# Patient Record
Sex: MALE | Race: WHITE | HISPANIC OR LATINO | Employment: FULL TIME | URBAN - METROPOLITAN AREA
[De-identification: names, ages, dates, MRNs, and addresses within clinical notes are randomized per-mention and may not be internally consistent; named-entity substitution may affect disease eponyms.]

---

## 2022-08-17 ENCOUNTER — HOSPITAL ENCOUNTER (EMERGENCY)
Facility: HOSPITAL | Age: 42
Discharge: ANOTHER HOSPITAL | End: 2022-08-17
Attending: EMERGENCY MEDICINE

## 2022-08-17 ENCOUNTER — HOSPITAL ENCOUNTER (OUTPATIENT)
Facility: HOSPITAL | Age: 42
Discharge: HOME | End: 2022-08-18
Attending: EMERGENCY MEDICINE | Admitting: INTERNAL MEDICINE

## 2022-08-17 VITALS
DIASTOLIC BLOOD PRESSURE: 76 MMHG | SYSTOLIC BLOOD PRESSURE: 120 MMHG | RESPIRATION RATE: 20 BRPM | OXYGEN SATURATION: 99 % | HEART RATE: 88 BPM | TEMPERATURE: 98 F

## 2022-08-17 DIAGNOSIS — S05.92XA LEFT EYE INJURY, INITIAL ENCOUNTER: Primary | ICD-10-CM

## 2022-08-17 DIAGNOSIS — S05.32XA RUPTURED GLOBE OF LEFT EYE, INITIAL ENCOUNTER: Primary | ICD-10-CM

## 2022-08-17 DIAGNOSIS — S05.32XA RUPTURED GLOBE OF LEFT EYE, INITIAL ENCOUNTER: ICD-10-CM

## 2022-08-17 DIAGNOSIS — S05.92XA: ICD-10-CM

## 2022-08-17 DIAGNOSIS — R07.9 CHEST PAIN: ICD-10-CM

## 2022-08-17 LAB
ANION GAP SERPL CALC-SCNC: 10 MMOL/L (ref 8–16)
BASOPHILS # BLD AUTO: 0.05 K/UL (ref 0–0.2)
BASOPHILS NFR BLD: 0.6 % (ref 0–1.9)
BUN SERPL-MCNC: 16 MG/DL (ref 6–20)
CALCIUM SERPL-MCNC: 9 MG/DL (ref 8.7–10.5)
CHLORIDE SERPL-SCNC: 105 MMOL/L (ref 95–110)
CO2 SERPL-SCNC: 26 MMOL/L (ref 23–29)
CREAT SERPL-MCNC: 0.9 MG/DL (ref 0.5–1.4)
DIFFERENTIAL METHOD: ABNORMAL
EOSINOPHIL # BLD AUTO: 0.1 K/UL (ref 0–0.5)
EOSINOPHIL NFR BLD: 1.6 % (ref 0–8)
ERYTHROCYTE [DISTWIDTH] IN BLOOD BY AUTOMATED COUNT: 12.1 % (ref 11.5–14.5)
EST. GFR  (NO RACE VARIABLE): >60 ML/MIN/1.73 M^2
GLUCOSE SERPL-MCNC: 119 MG/DL (ref 70–110)
HCT VFR BLD AUTO: 39.2 % (ref 40–54)
HGB BLD-MCNC: 13.7 G/DL (ref 14–18)
IMM GRANULOCYTES # BLD AUTO: 0.05 K/UL (ref 0–0.04)
IMM GRANULOCYTES NFR BLD AUTO: 0.6 % (ref 0–0.5)
LYMPHOCYTES # BLD AUTO: 1.7 K/UL (ref 1–4.8)
LYMPHOCYTES NFR BLD: 20.2 % (ref 18–48)
MCH RBC QN AUTO: 29.5 PG (ref 27–31)
MCHC RBC AUTO-ENTMCNC: 34.9 G/DL (ref 32–36)
MCV RBC AUTO: 84 FL (ref 82–98)
MONOCYTES # BLD AUTO: 0.7 K/UL (ref 0.3–1)
MONOCYTES NFR BLD: 7.6 % (ref 4–15)
NEUTROPHILS # BLD AUTO: 6 K/UL (ref 1.8–7.7)
NEUTROPHILS NFR BLD: 69.4 % (ref 38–73)
NRBC BLD-RTO: 0 /100 WBC
PLATELET # BLD AUTO: 262 K/UL (ref 150–450)
PMV BLD AUTO: 9 FL (ref 9.2–12.9)
POTASSIUM SERPL-SCNC: 3.6 MMOL/L (ref 3.5–5.1)
RBC # BLD AUTO: 4.65 M/UL (ref 4.6–6.2)
SODIUM SERPL-SCNC: 141 MMOL/L (ref 136–145)
WBC # BLD AUTO: 8.58 K/UL (ref 3.9–12.7)

## 2022-08-17 PROCEDURE — 99285 PR EMERGENCY DEPT VISIT,LEVEL V: ICD-10-PCS | Mod: CS,,, | Performed by: PHYSICIAN ASSISTANT

## 2022-08-17 PROCEDURE — 85025 COMPLETE CBC W/AUTO DIFF WBC: CPT | Performed by: EMERGENCY MEDICINE

## 2022-08-17 PROCEDURE — 96375 TX/PRO/DX INJ NEW DRUG ADDON: CPT

## 2022-08-17 PROCEDURE — 80048 BASIC METABOLIC PNL TOTAL CA: CPT | Performed by: EMERGENCY MEDICINE

## 2022-08-17 PROCEDURE — 90715 TDAP VACCINE 7 YRS/> IM: CPT | Performed by: EMERGENCY MEDICINE

## 2022-08-17 PROCEDURE — 63600175 PHARM REV CODE 636 W HCPCS: Performed by: EMERGENCY MEDICINE

## 2022-08-17 PROCEDURE — 99291 CRITICAL CARE FIRST HOUR: CPT | Mod: 25

## 2022-08-17 PROCEDURE — 63600175 PHARM REV CODE 636 W HCPCS: Performed by: PHYSICIAN ASSISTANT

## 2022-08-17 PROCEDURE — 99285 EMERGENCY DEPT VISIT HI MDM: CPT | Mod: CS,,, | Performed by: PHYSICIAN ASSISTANT

## 2022-08-17 PROCEDURE — 90471 IMMUNIZATION ADMIN: CPT | Performed by: EMERGENCY MEDICINE

## 2022-08-17 PROCEDURE — 99285 EMERGENCY DEPT VISIT HI MDM: CPT | Mod: 25

## 2022-08-17 RX ORDER — MORPHINE SULFATE 4 MG/ML
4 INJECTION, SOLUTION INTRAMUSCULAR; INTRAVENOUS
Status: COMPLETED | OUTPATIENT
Start: 2022-08-17 | End: 2022-08-17

## 2022-08-17 RX ORDER — LEVOFLOXACIN 5 MG/ML
750 INJECTION, SOLUTION INTRAVENOUS ONCE
Status: DISCONTINUED | OUTPATIENT
Start: 2022-08-17 | End: 2022-08-17 | Stop reason: HOSPADM

## 2022-08-17 RX ADMIN — MORPHINE SULFATE 4 MG: 4 INJECTION INTRAVENOUS at 11:08

## 2022-08-17 RX ADMIN — TETANUS TOXOID, REDUCED DIPHTHERIA TOXOID AND ACELLULAR PERTUSSIS VACCINE, ADSORBED 0.5 ML: 5; 2.5; 8; 8; 2.5 SUSPENSION INTRAMUSCULAR at 10:08

## 2022-08-18 ENCOUNTER — ANESTHESIA (OUTPATIENT)
Dept: SURGERY | Facility: HOSPITAL | Age: 42
End: 2022-08-18

## 2022-08-18 ENCOUNTER — ANESTHESIA EVENT (OUTPATIENT)
Dept: SURGERY | Facility: HOSPITAL | Age: 42
End: 2022-08-18

## 2022-08-18 ENCOUNTER — TELEPHONE (OUTPATIENT)
Dept: OPHTHALMOLOGY | Facility: CLINIC | Age: 42
End: 2022-08-18

## 2022-08-18 VITALS
OXYGEN SATURATION: 99 % | TEMPERATURE: 98 F | RESPIRATION RATE: 18 BRPM | SYSTOLIC BLOOD PRESSURE: 117 MMHG | DIASTOLIC BLOOD PRESSURE: 65 MMHG | HEART RATE: 78 BPM

## 2022-08-18 PROBLEM — S05.32XA RUPTURED GLOBE OF LEFT EYE: Status: ACTIVE | Noted: 2022-08-18

## 2022-08-18 LAB
ALBUMIN SERPL BCP-MCNC: 3.9 G/DL (ref 3.5–5.2)
ALP SERPL-CCNC: 63 U/L (ref 55–135)
ALT SERPL W/O P-5'-P-CCNC: 13 U/L (ref 10–44)
ANION GAP SERPL CALC-SCNC: 10 MMOL/L (ref 8–16)
AST SERPL-CCNC: 22 U/L (ref 10–40)
BASOPHILS # BLD AUTO: 0.03 K/UL (ref 0–0.2)
BASOPHILS NFR BLD: 0.4 % (ref 0–1.9)
BILIRUB SERPL-MCNC: 0.5 MG/DL (ref 0.1–1)
BUN SERPL-MCNC: 14 MG/DL (ref 6–20)
CALCIUM SERPL-MCNC: 9.2 MG/DL (ref 8.7–10.5)
CHLORIDE SERPL-SCNC: 106 MMOL/L (ref 95–110)
CO2 SERPL-SCNC: 22 MMOL/L (ref 23–29)
CREAT SERPL-MCNC: 0.8 MG/DL (ref 0.5–1.4)
DIFFERENTIAL METHOD: ABNORMAL
EOSINOPHIL # BLD AUTO: 0.1 K/UL (ref 0–0.5)
EOSINOPHIL NFR BLD: 1 % (ref 0–8)
ERYTHROCYTE [DISTWIDTH] IN BLOOD BY AUTOMATED COUNT: 12.3 % (ref 11.5–14.5)
EST. GFR  (NO RACE VARIABLE): >60 ML/MIN/1.73 M^2
GLUCOSE SERPL-MCNC: 120 MG/DL (ref 70–110)
HCT VFR BLD AUTO: 36.8 % (ref 40–54)
HGB BLD-MCNC: 13.6 G/DL (ref 14–18)
IMM GRANULOCYTES # BLD AUTO: 0.02 K/UL (ref 0–0.04)
IMM GRANULOCYTES NFR BLD AUTO: 0.3 % (ref 0–0.5)
LYMPHOCYTES # BLD AUTO: 2.1 K/UL (ref 1–4.8)
LYMPHOCYTES NFR BLD: 27.3 % (ref 18–48)
MAGNESIUM SERPL-MCNC: 1.9 MG/DL (ref 1.6–2.6)
MCH RBC QN AUTO: 31.1 PG (ref 27–31)
MCHC RBC AUTO-ENTMCNC: 37 G/DL (ref 32–36)
MCV RBC AUTO: 84 FL (ref 82–98)
MONOCYTES # BLD AUTO: 0.5 K/UL (ref 0.3–1)
MONOCYTES NFR BLD: 6.2 % (ref 4–15)
NEUTROPHILS # BLD AUTO: 5.1 K/UL (ref 1.8–7.7)
NEUTROPHILS NFR BLD: 64.8 % (ref 38–73)
NRBC BLD-RTO: 0 /100 WBC
PHOSPHATE SERPL-MCNC: 3.1 MG/DL (ref 2.7–4.5)
PLATELET # BLD AUTO: 255 K/UL (ref 150–450)
PMV BLD AUTO: 9.3 FL (ref 9.2–12.9)
POTASSIUM SERPL-SCNC: 3.7 MMOL/L (ref 3.5–5.1)
PROT SERPL-MCNC: 7.3 G/DL (ref 6–8.4)
RBC # BLD AUTO: 4.37 M/UL (ref 4.6–6.2)
SARS-COV-2 RDRP RESP QL NAA+PROBE: NEGATIVE
SODIUM SERPL-SCNC: 138 MMOL/L (ref 136–145)
WBC # BLD AUTO: 7.85 K/UL (ref 3.9–12.7)

## 2022-08-18 PROCEDURE — 67005 PR PART REMV VITREOUS,ANT APPRCH: ICD-10-PCS | Mod: LT,,, | Performed by: OPHTHALMOLOGY

## 2022-08-18 PROCEDURE — 27200651 HC AIRWAY, LMA: Performed by: ANESTHESIOLOGY

## 2022-08-18 PROCEDURE — 25000003 PHARM REV CODE 250: Performed by: PHYSICIAN ASSISTANT

## 2022-08-18 PROCEDURE — 25000003 PHARM REV CODE 250: Performed by: OPHTHALMOLOGY

## 2022-08-18 PROCEDURE — 63600175 PHARM REV CODE 636 W HCPCS: Performed by: OPHTHALMOLOGY

## 2022-08-18 PROCEDURE — 37000008 HC ANESTHESIA 1ST 15 MINUTES: Performed by: OPHTHALMOLOGY

## 2022-08-18 PROCEDURE — 63600175 PHARM REV CODE 636 W HCPCS: Performed by: PHYSICIAN ASSISTANT

## 2022-08-18 PROCEDURE — 71000044 HC DOSC ROUTINE RECOVERY FIRST HOUR: Performed by: OPHTHALMOLOGY

## 2022-08-18 PROCEDURE — 71000015 HC POSTOP RECOV 1ST HR: Performed by: OPHTHALMOLOGY

## 2022-08-18 PROCEDURE — 25000003 PHARM REV CODE 250

## 2022-08-18 PROCEDURE — 80053 COMPREHEN METABOLIC PANEL: CPT | Performed by: PHYSICIAN ASSISTANT

## 2022-08-18 PROCEDURE — 99217 PR OBSERVATION CARE DISCHARGE: ICD-10-PCS | Mod: ,,,

## 2022-08-18 PROCEDURE — G0378 HOSPITAL OBSERVATION PER HR: HCPCS

## 2022-08-18 PROCEDURE — D9220A PRA ANESTHESIA: Mod: ,,, | Performed by: ANESTHESIOLOGY

## 2022-08-18 PROCEDURE — 63600175 PHARM REV CODE 636 W HCPCS: Performed by: NURSE ANESTHETIST, CERTIFIED REGISTERED

## 2022-08-18 PROCEDURE — 99220 PR INITIAL OBSERVATION CARE,LEVL III: ICD-10-PCS | Mod: ,,, | Performed by: PHYSICIAN ASSISTANT

## 2022-08-18 PROCEDURE — 65275 REPAIR OF EYE WOUND: CPT | Mod: 51,LT,, | Performed by: OPHTHALMOLOGY

## 2022-08-18 PROCEDURE — 36000706: Performed by: OPHTHALMOLOGY

## 2022-08-18 PROCEDURE — 65275 PR REPAIR CORNEA LAC,NONPERFORATING: ICD-10-PCS | Mod: 51,LT,, | Performed by: OPHTHALMOLOGY

## 2022-08-18 PROCEDURE — 99217 PR OBSERVATION CARE DISCHARGE: CPT | Mod: ,,,

## 2022-08-18 PROCEDURE — 83735 ASSAY OF MAGNESIUM: CPT | Performed by: PHYSICIAN ASSISTANT

## 2022-08-18 PROCEDURE — 37000009 HC ANESTHESIA EA ADD 15 MINS: Performed by: OPHTHALMOLOGY

## 2022-08-18 PROCEDURE — 25000003 PHARM REV CODE 250: Performed by: NURSE ANESTHETIST, CERTIFIED REGISTERED

## 2022-08-18 PROCEDURE — 99220 PR INITIAL OBSERVATION CARE,LEVL III: CPT | Mod: ,,, | Performed by: PHYSICIAN ASSISTANT

## 2022-08-18 PROCEDURE — 63600175 PHARM REV CODE 636 W HCPCS

## 2022-08-18 PROCEDURE — 36000707: Performed by: OPHTHALMOLOGY

## 2022-08-18 PROCEDURE — 85025 COMPLETE CBC W/AUTO DIFF WBC: CPT | Performed by: PHYSICIAN ASSISTANT

## 2022-08-18 PROCEDURE — 96365 THER/PROPH/DIAG IV INF INIT: CPT

## 2022-08-18 PROCEDURE — 63600175 PHARM REV CODE 636 W HCPCS: Performed by: ANESTHESIOLOGY

## 2022-08-18 PROCEDURE — D9220A PRA ANESTHESIA: ICD-10-PCS | Mod: ,,, | Performed by: ANESTHESIOLOGY

## 2022-08-18 PROCEDURE — U0002 COVID-19 LAB TEST NON-CDC: HCPCS | Performed by: PHYSICIAN ASSISTANT

## 2022-08-18 PROCEDURE — 67005 PARTIAL REMOVAL OF EYE FLUID: CPT | Mod: LT,,, | Performed by: OPHTHALMOLOGY

## 2022-08-18 PROCEDURE — 84100 ASSAY OF PHOSPHORUS: CPT | Performed by: PHYSICIAN ASSISTANT

## 2022-08-18 PROCEDURE — 71000016 HC POSTOP RECOV ADDL HR: Performed by: OPHTHALMOLOGY

## 2022-08-18 RX ORDER — HYDROCODONE BITARTRATE AND ACETAMINOPHEN 5; 325 MG/1; MG/1
1 TABLET ORAL EVERY 6 HOURS PRN
Qty: 28 TABLET | Refills: 0 | Status: SHIPPED | OUTPATIENT
Start: 2022-08-18 | End: 2022-08-25

## 2022-08-18 RX ORDER — MIDAZOLAM HYDROCHLORIDE 1 MG/ML
INJECTION, SOLUTION INTRAMUSCULAR; INTRAVENOUS
Status: DISCONTINUED | OUTPATIENT
Start: 2022-08-18 | End: 2022-08-18

## 2022-08-18 RX ORDER — SIMETHICONE 80 MG
1 TABLET,CHEWABLE ORAL 4 TIMES DAILY PRN
Status: DISCONTINUED | OUTPATIENT
Start: 2022-08-18 | End: 2022-08-18 | Stop reason: HOSPADM

## 2022-08-18 RX ORDER — CEFAZOLIN SODIUM 500 MG/2.2ML
INJECTION, POWDER, FOR SOLUTION INTRAMUSCULAR; INTRAVENOUS
Status: DISCONTINUED | OUTPATIENT
Start: 2022-08-18 | End: 2022-08-18 | Stop reason: HOSPADM

## 2022-08-18 RX ORDER — IBUPROFEN 200 MG
24 TABLET ORAL
Status: DISCONTINUED | OUTPATIENT
Start: 2022-08-18 | End: 2022-08-18 | Stop reason: HOSPADM

## 2022-08-18 RX ORDER — ONDANSETRON 2 MG/ML
INJECTION INTRAMUSCULAR; INTRAVENOUS
Status: DISCONTINUED | OUTPATIENT
Start: 2022-08-18 | End: 2022-08-18

## 2022-08-18 RX ORDER — PREDNISOLONE ACETATE 10 MG/ML
SUSPENSION/ DROPS OPHTHALMIC
Status: DISCONTINUED
Start: 2022-08-18 | End: 2022-08-18 | Stop reason: HOSPADM

## 2022-08-18 RX ORDER — LIDOCAINE HYDROCHLORIDE 10 MG/ML
INJECTION, SOLUTION INTRAVENOUS
Status: DISCONTINUED | OUTPATIENT
Start: 2022-08-18 | End: 2022-08-18

## 2022-08-18 RX ORDER — ACETAMINOPHEN 325 MG/1
650 TABLET ORAL EVERY 4 HOURS PRN
Status: DISCONTINUED | OUTPATIENT
Start: 2022-08-18 | End: 2022-08-18 | Stop reason: HOSPADM

## 2022-08-18 RX ORDER — SODIUM CHLORIDE 0.9 % (FLUSH) 0.9 %
10 SYRINGE (ML) INJECTION
Status: DISCONTINUED | OUTPATIENT
Start: 2022-08-18 | End: 2022-08-18 | Stop reason: HOSPADM

## 2022-08-18 RX ORDER — DEXAMETHASONE SODIUM PHOSPHATE 4 MG/ML
INJECTION, SOLUTION INTRA-ARTICULAR; INTRALESIONAL; INTRAMUSCULAR; INTRAVENOUS; SOFT TISSUE
Status: DISCONTINUED
Start: 2022-08-18 | End: 2022-08-18 | Stop reason: HOSPADM

## 2022-08-18 RX ORDER — NALOXONE HCL 0.4 MG/ML
0.02 VIAL (ML) INJECTION
Status: DISCONTINUED | OUTPATIENT
Start: 2022-08-18 | End: 2022-08-18 | Stop reason: HOSPADM

## 2022-08-18 RX ORDER — MOXIFLOXACIN HYDROCHLORIDE 400 MG/1
400 TABLET ORAL DAILY
Qty: 7 TABLET | Refills: 0 | Status: SHIPPED | OUTPATIENT
Start: 2022-08-18 | End: 2022-08-25

## 2022-08-18 RX ORDER — PREDNISOLONE ACETATE 10 MG/ML
SUSPENSION/ DROPS OPHTHALMIC
Status: DISCONTINUED | OUTPATIENT
Start: 2022-08-18 | End: 2022-08-18 | Stop reason: HOSPADM

## 2022-08-18 RX ORDER — CEFAZOLIN SODIUM 500 MG/2.2ML
INJECTION, POWDER, FOR SOLUTION INTRAMUSCULAR; INTRAVENOUS
Status: DISCONTINUED
Start: 2022-08-18 | End: 2022-08-18 | Stop reason: HOSPADM

## 2022-08-18 RX ORDER — HYDROMORPHONE HYDROCHLORIDE 1 MG/ML
0.2 INJECTION, SOLUTION INTRAMUSCULAR; INTRAVENOUS; SUBCUTANEOUS EVERY 5 MIN PRN
Status: COMPLETED | OUTPATIENT
Start: 2022-08-18 | End: 2022-08-18

## 2022-08-18 RX ORDER — ACETAMINOPHEN 500 MG
1000 TABLET ORAL 3 TIMES DAILY
Status: DISCONTINUED | OUTPATIENT
Start: 2022-08-18 | End: 2022-08-18 | Stop reason: HOSPADM

## 2022-08-18 RX ORDER — GLUCAGON 1 MG
1 KIT INJECTION
Status: DISCONTINUED | OUTPATIENT
Start: 2022-08-18 | End: 2022-08-18 | Stop reason: HOSPADM

## 2022-08-18 RX ORDER — MOXIFLOXACIN 5 MG/ML
SOLUTION/ DROPS OPHTHALMIC
Status: DISCONTINUED | OUTPATIENT
Start: 2022-08-18 | End: 2022-08-18 | Stop reason: HOSPADM

## 2022-08-18 RX ORDER — FENTANYL CITRATE 50 UG/ML
25 INJECTION, SOLUTION INTRAMUSCULAR; INTRAVENOUS EVERY 5 MIN PRN
Status: COMPLETED | OUTPATIENT
Start: 2022-08-18 | End: 2022-08-18

## 2022-08-18 RX ORDER — IBUPROFEN 200 MG
16 TABLET ORAL
Status: DISCONTINUED | OUTPATIENT
Start: 2022-08-18 | End: 2022-08-18 | Stop reason: HOSPADM

## 2022-08-18 RX ORDER — ONDANSETRON 4 MG/1
8 TABLET, ORALLY DISINTEGRATING ORAL EVERY 8 HOURS PRN
Status: DISCONTINUED | OUTPATIENT
Start: 2022-08-18 | End: 2022-08-18 | Stop reason: HOSPADM

## 2022-08-18 RX ORDER — LEVOFLOXACIN 750 MG/1
750 TABLET ORAL DAILY
Status: DISCONTINUED | OUTPATIENT
Start: 2022-08-18 | End: 2022-08-18 | Stop reason: HOSPADM

## 2022-08-18 RX ORDER — ACETAMINOPHEN 325 MG/1
650 TABLET ORAL EVERY 8 HOURS PRN
Status: DISCONTINUED | OUTPATIENT
Start: 2022-08-18 | End: 2022-08-18 | Stop reason: HOSPADM

## 2022-08-18 RX ORDER — HYDROMORPHONE HYDROCHLORIDE 1 MG/ML
0.5 INJECTION, SOLUTION INTRAMUSCULAR; INTRAVENOUS; SUBCUTANEOUS EVERY 4 HOURS PRN
Status: DISCONTINUED | OUTPATIENT
Start: 2022-08-18 | End: 2022-08-18

## 2022-08-18 RX ORDER — PROPOFOL 10 MG/ML
VIAL (ML) INTRAVENOUS
Status: DISCONTINUED | OUTPATIENT
Start: 2022-08-18 | End: 2022-08-18

## 2022-08-18 RX ORDER — POLYETHYLENE GLYCOL 3350 17 G/17G
17 POWDER, FOR SOLUTION ORAL DAILY
Status: DISCONTINUED | OUTPATIENT
Start: 2022-08-18 | End: 2022-08-18 | Stop reason: HOSPADM

## 2022-08-18 RX ORDER — SODIUM CHLORIDE 0.9 % (FLUSH) 0.9 %
5 SYRINGE (ML) INJECTION
Status: DISCONTINUED | OUTPATIENT
Start: 2022-08-18 | End: 2022-08-18 | Stop reason: HOSPADM

## 2022-08-18 RX ORDER — DEXAMETHASONE SODIUM PHOSPHATE 4 MG/ML
INJECTION, SOLUTION INTRA-ARTICULAR; INTRALESIONAL; INTRAMUSCULAR; INTRAVENOUS; SOFT TISSUE
Status: DISCONTINUED | OUTPATIENT
Start: 2022-08-18 | End: 2022-08-18 | Stop reason: HOSPADM

## 2022-08-18 RX ORDER — HYDROCODONE BITARTRATE AND ACETAMINOPHEN 5; 325 MG/1; MG/1
1 TABLET ORAL EVERY 6 HOURS PRN
Status: DISCONTINUED | OUTPATIENT
Start: 2022-08-18 | End: 2022-08-18 | Stop reason: HOSPADM

## 2022-08-18 RX ORDER — MAG HYDROX/ALUMINUM HYD/SIMETH 200-200-20
30 SUSPENSION, ORAL (FINAL DOSE FORM) ORAL 4 TIMES DAILY PRN
Status: DISCONTINUED | OUTPATIENT
Start: 2022-08-18 | End: 2022-08-18 | Stop reason: HOSPADM

## 2022-08-18 RX ORDER — VANCOMYCIN HCL IN 5 % DEXTROSE 1G/250ML
1000 PLASTIC BAG, INJECTION (ML) INTRAVENOUS
Status: DISCONTINUED | OUTPATIENT
Start: 2022-08-18 | End: 2022-08-18 | Stop reason: HOSPADM

## 2022-08-18 RX ORDER — MOXIFLOXACIN 5 MG/ML
SOLUTION/ DROPS OPHTHALMIC
Status: DISCONTINUED
Start: 2022-08-18 | End: 2022-08-18 | Stop reason: HOSPADM

## 2022-08-18 RX ORDER — TALC
9 POWDER (GRAM) TOPICAL NIGHTLY PRN
Status: DISCONTINUED | OUTPATIENT
Start: 2022-08-18 | End: 2022-08-18 | Stop reason: HOSPADM

## 2022-08-18 RX ORDER — HYDROMORPHONE HYDROCHLORIDE 1 MG/ML
INJECTION, SOLUTION INTRAMUSCULAR; INTRAVENOUS; SUBCUTANEOUS
Status: COMPLETED
Start: 2022-08-18 | End: 2022-08-18

## 2022-08-18 RX ORDER — BISACODYL 10 MG
10 SUPPOSITORY, RECTAL RECTAL DAILY PRN
Status: DISCONTINUED | OUTPATIENT
Start: 2022-08-18 | End: 2022-08-18 | Stop reason: HOSPADM

## 2022-08-18 RX ORDER — HYDROCODONE BITARTRATE AND ACETAMINOPHEN 10; 325 MG/1; MG/1
1 TABLET ORAL EVERY 6 HOURS PRN
Status: DISCONTINUED | OUTPATIENT
Start: 2022-08-18 | End: 2022-08-18

## 2022-08-18 RX ORDER — MORPHINE SULFATE 4 MG/ML
4 INJECTION, SOLUTION INTRAMUSCULAR; INTRAVENOUS EVERY 6 HOURS PRN
Status: DISCONTINUED | OUTPATIENT
Start: 2022-08-18 | End: 2022-08-18 | Stop reason: HOSPADM

## 2022-08-18 RX ORDER — IPRATROPIUM BROMIDE AND ALBUTEROL SULFATE 2.5; .5 MG/3ML; MG/3ML
3 SOLUTION RESPIRATORY (INHALATION) EVERY 4 HOURS PRN
Status: DISCONTINUED | OUTPATIENT
Start: 2022-08-18 | End: 2022-08-18 | Stop reason: HOSPADM

## 2022-08-18 RX ADMIN — HYDROMORPHONE HYDROCHLORIDE 0.2 MG: 1 INJECTION, SOLUTION INTRAMUSCULAR; INTRAVENOUS; SUBCUTANEOUS at 11:08

## 2022-08-18 RX ADMIN — HYDROMORPHONE HYDROCHLORIDE 0.2 MG: 1 INJECTION, SOLUTION INTRAMUSCULAR; INTRAVENOUS; SUBCUTANEOUS at 10:08

## 2022-08-18 RX ADMIN — FENTANYL CITRATE 25 MCG: 50 INJECTION INTRAMUSCULAR; INTRAVENOUS at 10:08

## 2022-08-18 RX ADMIN — ONDANSETRON 4 MG: 2 INJECTION INTRAMUSCULAR; INTRAVENOUS at 08:08

## 2022-08-18 RX ADMIN — SODIUM CHLORIDE, SODIUM LACTATE, POTASSIUM CHLORIDE, AND CALCIUM CHLORIDE 1000 ML: .6; .31; .03; .02 INJECTION, SOLUTION INTRAVENOUS at 03:08

## 2022-08-18 RX ADMIN — SODIUM CHLORIDE, SODIUM GLUCONATE, SODIUM ACETATE, POTASSIUM CHLORIDE, MAGNESIUM CHLORIDE, SODIUM PHOSPHATE, DIBASIC, AND POTASSIUM PHOSPHATE: .53; .5; .37; .037; .03; .012; .00082 INJECTION, SOLUTION INTRAVENOUS at 07:08

## 2022-08-18 RX ADMIN — VANCOMYCIN HYDROCHLORIDE 1000 MG: 1 INJECTION, POWDER, LYOPHILIZED, FOR SOLUTION INTRAVENOUS at 01:08

## 2022-08-18 RX ADMIN — HYDROCODONE BITARTRATE AND ACETAMINOPHEN 1 TABLET: 5; 325 TABLET ORAL at 03:08

## 2022-08-18 RX ADMIN — FENTANYL CITRATE 25 MCG: 50 INJECTION INTRAMUSCULAR; INTRAVENOUS at 09:08

## 2022-08-18 RX ADMIN — HYDROCODONE BITARTRATE AND ACETAMINOPHEN 1 TABLET: 5; 325 TABLET ORAL at 09:08

## 2022-08-18 RX ADMIN — LIDOCAINE HYDROCHLORIDE 50 MG: 10 INJECTION, SOLUTION INTRAVENOUS at 07:08

## 2022-08-18 RX ADMIN — MIDAZOLAM HYDROCHLORIDE 2 MG: 1 INJECTION, SOLUTION INTRAMUSCULAR; INTRAVENOUS at 07:08

## 2022-08-18 RX ADMIN — PROPOFOL 160 MG: 10 INJECTION, EMULSION INTRAVENOUS at 07:08

## 2022-08-18 NOTE — H&P
"[facility] - Emergency Dept  [facility]  History & Physical    Patient Name: Enzo Mejia  MRN: [id number]  Patient Class: OP- Observation  Admission Date: 8/17/2022  Attending Physician: Bertha Downing MD   Primary Care Provider: No primary care provider on file.         Patient information was obtained from patient and ER records.     Subjective:     Principal Problem:Ruptured globe of left eye    Chief Complaint:   Chief Complaint   Patient presents with    Transfer     Transfer from [facility] for optho d/t open globe        HPI: Mr. Enzo Mejia is a 42 y.o. male with no POHx  who [place] [facility] [place]. Patient is Pashto speaking and a video  was used for translation. Patient reports around 630 pm he dropped a glass bottle and something hit his left eye, he suspects it was glass from the broken bottle but it could've been the bottle cap. He felt immediate pain and vision changes like he was "looking through a cracked glass" in his left eye. No vision loss, flashes, floaters, curtain or veils over vision. Patient was taken ED in [place] where he was given Tdap, Levofloxacin, and CT orbit showed ruptured, deflated globe with no visible intraocular foreign body, and was [place] [facility] Pain is currently 3/10 after receiving IV morphine. He denies any fevers, chills, HA, CP, SOB, cough, abdominal pain, N/V, C/D, urinary symptoms, weakness, numbness.     In ED, VSS. Ophthalmology consulted, plan for OR for repair of open globe. Started on IV Vanc and patient admitted to observation for ruptured globe.       History reviewed. No pertinent past medical history.    History reviewed. No pertinent surgical history.    Review of patient's allergies indicates:   Allergen Reactions    Penicillins        Current Facility-Administered Medications on File Prior to Encounter   Medication    [COMPLETED] " Tdap (BOOSTRIX) vaccine injection 0.5 mL    [DISCONTINUED] levoFLOXacin 750 mg/150 mL IVPB 750 mg     No current outpatient medications on file prior to encounter.     Family History    None       Tobacco Use    Smoking status: Not on file    Smokeless tobacco: Not on file   Substance and Sexual Activity    Alcohol use: Not on file    Drug use: Not on file    Sexual activity: Not on file     Review of Systems   Constitutional:  Negative for activity change, chills, diaphoresis, fever and unexpected weight change.   HENT:  Negative for congestion, nosebleeds and postnasal drip.    Eyes:  Positive for pain, redness and visual disturbance.   Respiratory:  Negative for cough, chest tightness, shortness of breath and wheezing.    Cardiovascular:  Negative for chest pain, palpitations and leg swelling.   Gastrointestinal:  Negative for abdominal distention, abdominal pain, constipation, diarrhea, nausea and vomiting.   Genitourinary:  Negative for difficulty urinating, dysuria, flank pain, frequency, hematuria and urgency.   Musculoskeletal:  Negative for arthralgias, back pain, gait problem and joint swelling.   Skin:  Negative for color change and pallor.   Neurological:  Negative for dizziness, syncope, weakness, light-headedness and headaches.   Psychiatric/Behavioral:  Negative for agitation, behavioral problems and confusion.    Objective:     Vital Signs (Most Recent):  Temp: 97.6 °F (36.4 °C) (08/17/22 2248)  Pulse: 84 (08/17/22 2248)  Resp: 18 (08/17/22 2339)  BP: 120/78 (08/17/22 2248)  SpO2: 99 % (08/17/22 2248) Vital Signs (24h Range):  Temp:  [97.6 °F (36.4 °C)-98.5 °F (36.9 °C)] 97.6 °F (36.4 °C)  Pulse:  [84-88] 84  Resp:  [18-20] 18  SpO2:  [99 %-100 %] 99 %  BP: (120-140)/(75-78) 120/78        There is no height or weight on file to calculate BMI.    Physical Exam  Vitals and nursing note reviewed.   Constitutional:       Appearance: Normal appearance. He is normal weight.   HENT:      Head:  Normocephalic.      Nose: Nose normal. No congestion.      Mouth/Throat:      Mouth: Mucous membranes are moist.      Pharynx: Oropharynx is clear.   Eyes:      General:         Right eye: No discharge.      Comments: Left eye with shield in place   Cardiovascular:      Rate and Rhythm: Normal rate and regular rhythm.      Pulses: Normal pulses.      Heart sounds: Normal heart sounds.   Pulmonary:      Effort: Pulmonary effort is normal.      Breath sounds: Normal breath sounds. No wheezing, rhonchi or rales.   Chest:      Chest wall: No tenderness.   Abdominal:      General: Abdomen is flat. Bowel sounds are normal. There is no distension.      Palpations: Abdomen is soft.      Tenderness: There is no abdominal tenderness. There is no guarding.   Musculoskeletal:         General: No tenderness. Normal range of motion.      Cervical back: Normal range of motion and neck supple.      Right lower leg: No edema.      Left lower leg: No edema.   Skin:     General: Skin is warm and dry.   Neurological:      General: No focal deficit present.      Mental Status: He is alert and oriented to person, place, and time. Mental status is at baseline.      Cranial Nerves: No cranial nerve deficit.      Motor: No weakness.   Psychiatric:         Mood and Affect: Mood normal.         Behavior: Behavior normal.           Significant Labs: All pertinent labs within the past 24 hours have been reviewed.  Blood Culture: No results for input(s): LABBLOO in the last 48 hours.  CBC:   Recent Labs   Lab 08/17/22 2203 08/18/22  0119   WBC 8.58 7.85   HGB 13.7* 13.6*   HCT 39.2* 36.8*    255     CMP:   Recent Labs   Lab 08/17/22 2203 08/18/22  0119    138   K 3.6 3.7    106   CO2 26 22*   * 120*   BUN 16 14   CREATININE 0.9 0.8   CALCIUM 9.0 9.2   PROT  --  7.3   ALBUMIN  --  3.9   BILITOT  --  0.5   ALKPHOS  --  63   AST  --  22   ALT  --  13   ANIONGAP 10 10     Lactic Acid: No results for input(s): LACTATE in  the last 48 hours.  Magnesium: No results for input(s): MG in the last 48 hours.  POCT Glucose: No results for input(s): POCTGLUCOSE in the last 48 hours.  Urine Studies: No results for input(s): COLORU, APPEARANCEUA, PHUR, SPECGRAV, PROTEINUA, GLUCUA, KETONESU, BILIRUBINUA, OCCULTUA, NITRITE, UROBILINOGEN, LEUKOCYTESUR, RBCUA, WBCUA, BACTERIA, SQUAMEPITHEL, HYALINECASTS in the last 48 hours.    Invalid input(s): WRIGHTSUR    Significant Imaging: I have reviewed all pertinent imaging results/findings within the past 24 hours.    Assessment/Plan:     * Ruptured globe of left eye  Patient transferred from OSH for ruptured globe after injury from broken glass bottle. CT orbit with ruptured, deflated left globe without visible intraocular foreign body  - Received Tdap and Levofloxacin at OSH  - VSS, 0/4 SIRS  - Ophthalmology evaluated, plan for repair 8/18  - started on IV Vancomycin + levofloxacin for prevention of posttraumatic endophthalmitis (allergy to PNC)  - left eye shield in place  - NPO  - pain control        VTE Risk Mitigation (From admission, onward)         Ordered     IP VTE LOW RISK PATIENT  Once         08/18/22 0148     Place sequential compression device  Until discontinued         08/18/22 0148                   Bailey Bo PA-C  Department of Hospital Medicine   [facility] - Emergency Dept

## 2022-08-18 NOTE — ED PROVIDER NOTES
Encounter Date: 8/17/2022       History     Chief Complaint   Patient presents with    Transfer     Transfer from [facility] for optho d/t open globe     42-year-old male transferred to our facility from [place] for ophthalmology evaluation of an open globe injury.  The patient accidentally dropped a glass bottle.  He felt something hit him in the eye at that time.  Since then he has had poor vision with obvious pupillary defect and internal injury.  Scan concerning for open globe, patient transferred for emergent Ophthalmology evaluation.        Review of patient's allergies indicates:   Allergen Reactions    Penicillins      History reviewed. No pertinent past medical history.  History reviewed. No pertinent surgical history.  No family history on file.     Review of Systems   Constitutional: Negative for fever.   HENT: Negative for sore throat.    Eyes:        Left eye injury, left eye is covered with a patch.    Respiratory: Negative for shortness of breath.    Cardiovascular: Negative for chest pain.   Gastrointestinal: Negative for nausea.   Genitourinary: Negative for dysuria.   Musculoskeletal: Negative for back pain.   Skin: Negative for rash.   Neurological: Negative for weakness.   Hematological: Does not bruise/bleed easily.       Physical Exam     Initial Vitals [08/17/22 2248]   BP Pulse Resp Temp SpO2   120/78 84 20 97.6 °F (36.4 °C) 99 %      MAP       --         Physical Exam    Constitutional: Vital signs are normal. He appears well-developed and well-nourished.   HENT:   Head: Normocephalic and atraumatic.   Right Ear: Hearing normal.   Left Ear: Hearing normal.   Eyes: Conjunctivae are normal.   Left eye injury. Pupil defect,    Cardiovascular: Normal rate and regular rhythm.   Abdominal: Abdomen is soft. Bowel sounds are normal.   Musculoskeletal:         General: Normal range of motion.     Neurological: He is alert and oriented to person, place, and time.   Skin: Skin is warm and intact.    Psychiatric: He has a normal mood and affect. His speech is normal and behavior is normal. Cognition and memory are normal.         ED Course   Procedures  Labs Reviewed   SARS-COV-2 RNA AMPLIFICATION, QUAL   CBC W/ AUTO DIFFERENTIAL   COMPREHENSIVE METABOLIC PANEL          Imaging Results    None          Medications   morphine injection 4 mg (4 mg Intravenous Given 8/17/22 3718)     Medical Decision Making:   History:   Old Medical Records: I decided to obtain old medical records.  Old Records Summarized: records from clinic visits.  Initial Assessment:   42-year-old male presenting with open globe injury from outside facility  Differential Diagnosis:   Pupillary defect, open globe injury, eye laceration  Clinical Tests:   Lab Tests: Ordered and Reviewed  Radiological Study: Reviewed  ED Management:  Plan:  Case discussed with Ophthalmology.  Patient NPO at this time, has not eaten since 7:00 p.m..  Will be admitted for surgery.            Attending Attestation:     Physician Attestation Statement for NP/PA:   I reviewed the chart but I did not personally examine the patient. The face to face encounter was performed by the NP/PA.                       Clinical Impression:   Final diagnoses:  [S05.92XA] Left eye injury, initial encounter (Primary)          ED Disposition Condition    Admit               Darnell Mcadams PA-C  08/18/22 0015       Darnell Mcadams PA-C  08/22/22 2208       Bertha Downing MD  08/24/22 2205

## 2022-08-18 NOTE — TELEPHONE ENCOUNTER
"----- Message from Castro Mckeon MD sent at 8/18/2022 11:11 AM CDT -----  Regarding: Follow up  Hi,    Can we please get this patient follow up in Dr Christianson's clinic tomorrow, 08/19/22 @ 1 PM for POD1 f/u of open globe repair.     #Patient is Polish speaking only and preferred name is "Enzo De Luna"    Patient's Best Contact Number: [phone]    Thanks!      Castro Mckeon MD  [facility], PGY-2  Pager: [phone]      "

## 2022-08-18 NOTE — ED PROVIDER NOTES
Chief Complaint: eye pain, bleeding and vision change     History of Present Illness:    Enzo Mejia 42 y.o. with a has no past medical history  who presents to the emergency department today with a complaint of eye pain, bleeding and vision change. Pt was going to eat dinner, was drinking a coke from a glass bottle and it dropped. He felt something hit his eye and then he had pain, bleeding and vision change to the eye. Occurred 30 minutes prior to arrival.  No fevers, chills or sweats. No headache, facial pain.        ROS    Constitutional: No fever, no chills.  Eyes: No discharge. No pain.  HENT: No nasal drainage. No ear ache. No sore throat.  Cardiovascular: No chest pain, no palpitations.  Respiratory: No cough, no shortness of breath.  Gastrointestinal: No abdominal pain, no vomiting. No diarrhea.  Genitourinary: No hematuria, dysuria, urgency.  Musculoskeletal: No back pain.   Skin: No rashes, no lesions.  Neurological: No headache, no focal weakness.    Otherwise remaining ROS negative     The history is provided by the patient      ALLERGIES REVIEWED  MEDICATIONS REVIEWED  PMH/PSH/SOC/FH REVIEWED :  Enzo Mejia  has no past medical history on file. and   has no past surgical history on file. with   and a family history is not on file.      Nursing/Ancillary staff note reviewed.  VS reviewed         Physical Exam     /76 (BP Location: Right arm, Patient Position: Lying)   Pulse 88   Temp 97.6 °F (36.4 °C) (Oral)   Resp 20   SpO2 99%     Physical Exam  Vitals and nursing note reviewed.   Constitutional:       General: He is not in acute distress.     Appearance: He is well-developed.   HENT:      Head: Normocephalic and atraumatic.      Right Ear: External ear normal.      Left Ear: External ear normal.   Eyes:      General: Lids are normal.         Right eye: No discharge.         Left eye: No discharge.      Pupils:      Left eye: Pupil is not  round and not reactive.     Cardiovascular:      Rate and Rhythm: Normal rate.   Pulmonary:      Effort: Pulmonary effort is normal. No respiratory distress.   Abdominal:      General: There is no distension.   Musculoskeletal:         General: Normal range of motion.      Cervical back: Normal range of motion.   Skin:     General: Skin is warm and dry.   Neurological:      Mental Status: He is alert and oriented to person, place, and time.      Cranial Nerves: No cranial nerve deficit.      Motor: No abnormal muscle tone.   Psychiatric:         Behavior: Behavior normal.           DIFFERENTIAL DIAGNOSIS: After history and physical exam a differential diagnosis was considered, but was not limited to, open globe, traumatic iritis           Reviewed by myself, read by radiology.     CT ORBITS WITHOUT CONTRAST   Final Result   Abnormal      Deflated optic globe on the left with decrease in size of the optic nerve and apparent dislocation or absence of the optic lens.      No discrete foreign body within the pre or postseptal orbital soft tissues.      This report was flagged in Epic as abnormal.         Electronically signed by: Miles Pat   Date:    08/17/2022   Time:    21:06              ED Course     Medications   Tdap (BOOSTRIX) vaccine injection 0.5 mL (0.5 mLs Intramuscular Given 8/17/22 2209)         ED Course as of 08/17/22 2311   Wed Aug 17, 2022   2050 Transfer center called back - attempting to reach ophthalmologist.  [JA]   2136 Pt has been accepted by Dr Christianson, ED to ED transfer. Transfer center will set up STAT transfer.  [JA]      ED Course User Index  [JA] Dillon Perry MD              Medical Decision Making:   History:   I obtained history from:  The patient  Old Medical Records: I decided to obtain old medical records            Initial management:  Enzo Emery Annamarie Mejia 42 y.o. male who presents to the ED today for eye injury - he has an obvious open globe on exam, tear  shaped with extrusion. Will not obtain IOPs. He will need emergent evaluation by ophthalmology, I will place order for CT of the orbits for their benefit but pt will need transfer.  Will speak with transfer center Allergic to penicillin, will give flouroquinolone.       ED Course as of 08/17/22 2311   Wed Aug 17, 2022   2050 Transfer center called back - attempting to reach ophthalmologist.  [JA]   2136 Pt has been accepted by Dr Christianson, ED to ED transfer. Transfer center will set up STAT transfer.  [JA]      ED Course User Index  [JA] Dillon Perry MD             ED Management:  Enzo Mejia  presents to the emergency Department today with an open globe. He will be transferred to [facility] for ophthalmology services. He is stable and appropriate for transfer.        The pt is comfortable with this plan.      Voice recognition software utilized in this note.              Impression      The primary encounter diagnosis was Ruptured globe of left eye, initial encounter. A diagnosis of Wound, eye, open, left, initial encounter was also pertinent to this visit.        Critical Care   Date/Time:   Performed by: Dillon Perry MD  Authorized by: Dillon Perry MD   Total critical care time (exclusive of procedural time) :  32 minutes  Critical care time was exclusive of separately billable procedures and treating other patients.  Critical care was necessary to treat or prevent imminent or life-threatening deterioration of the following conditions: Globe rupture, vision-threatening emergency, need for surgery acutely.   Critical care was time spent personally by me on the following activities: development of treatment plan with patient or surrogate, interpretation of cardiac output measurements, examination of patient, evaluation of patient's response to treatment, obtaining history from patient or surrogate, ordering and performing treatments and interventions, ordering and review of  radiographic studies, ordering and review of laboratory studies, pulse oximetry, review of old charts and re-evaluation of patient's condition.          Dillon Perry MD  08/17/22 7179

## 2022-08-18 NOTE — ANESTHESIA PREPROCEDURE EVALUATION
[facility]  Anesthesia Pre-Operative Evaluation         Patient Name: Enzo Mejia  YOB: 1980  MRN: [id number]    SUBJECTIVE:     Pre-operative evaluation for Procedure(s) (LRB):  REPAIR, OPEN GLOBE (Left)     08/18/2022    Enzo Mejia is a 42 y.o. male w/ no significant PMHx presents with L open globe injury. He states he dropped a glass bottle and then felt something hit his L eye. Denies any further PMHx.    NPO since 12pm yesterday (spaghetti, chicken, and soda)    Penicillin allergy    Has received one dose of vanc and one dose of morphine.    LDA:        Peripheral IV - Single Lumen 08/17/22 2203 18 G Right Antecubital (Active)   Number of days: 0     Prev airway: None documented.    Drips: None documented.    Patient Active Problem List   Diagnosis    Ruptured globe of left eye       Review of patient's allergies indicates:   Allergen Reactions    Penicillins        Current Inpatient Medications:   acetaminophen  1,000 mg Oral TID    lactated ringers  1,000 mL Intravenous Once    levoFLOXacin  750 mg Oral Daily    polyethylene glycol  17 g Oral Daily    vancomycin (VANCOCIN) IVPB  1,000 mg Intravenous Q12H       No current facility-administered medications on file prior to encounter.     No current outpatient medications on file prior to encounter.       History reviewed. No pertinent surgical history.    Social History     Socioeconomic History    Marital status: Single       OBJECTIVE:     Vital Signs Range (Last 24H):  Temp:  [36.4 °C (97.6 °F)-37.1 °C (98.8 °F)]   Pulse:  [67-88]   Resp:  [16-20]   BP: (101-140)/(55-78)   SpO2:  [98 %-100 %]       Significant Labs:  Lab Results   Component Value Date    WBC 7.85 08/18/2022    HGB 13.6 (L) 08/18/2022    HCT 36.8 (L) 08/18/2022     08/18/2022    ALT 13 08/18/2022    AST 22 08/18/2022    NA  138 08/18/2022    K 3.7 08/18/2022     08/18/2022    CREATININE 0.8 08/18/2022    BUN 14 08/18/2022    CO2 22 (L) 08/18/2022       Diagnostic Studies: No relevant studies.    EKG:   No results found for this or any previous visit.    2D ECHO:  TTE:  No results found for this or any previous visit.    ESTEBAN:  No results found for this or any previous visit.    ASSESSMENT/PLAN:         Pre-op Assessment    I have reviewed the Patient Summary Reports.     I have reviewed the Nursing Notes. I have reviewed the NPO Status.   I have reviewed the Medications.     Review of Systems  Anesthesia Hx:  No problems with previous Anesthesia  History of prior surgery of interest to airway management or planning: Denies Family Hx of Anesthesia complications.   Denies Personal Hx of Anesthesia complications.   Social:  Non-Smoker, No Alcohol Use    Hematology/Oncology:  Hematology Normal   Oncology Normal     EENT/Dental:EENT/Dental Normal   Cardiovascular:   Exercise tolerance: good Denies CAD.    Denies Dysrhythmias.     Pulmonary:   Denies COPD.  Denies Sleep Apnea.    Hepatic/GI:   Denies GERD.    Neurological:   Denies Neuromuscular Disease.    Endocrine:   Denies Diabetes.    Psych:   Denies Psychiatric History.          Physical Exam  General: Well nourished, Cooperative, Alert and Oriented    Airway:  Mallampati: III / II  Mouth Opening: Normal  TM Distance: Normal  Tongue: Normal  Neck ROM: Normal ROM    Dental:  Intact    Chest/Lungs:  Clear to auscultation, Normal Respiratory Rate    Heart:  Rate: Normal  Rhythm: Regular Rhythm  Sounds: Normal    Abdomen:  Nontender, Soft, Normal        Anesthesia Plan  Type of Anesthesia, risks & benefits discussed:    Anesthesia Type: Gen ETT  Intra-op Monitoring Plan: Standard ASA Monitors  Post Op Pain Control Plan: multimodal analgesia  Induction:  IV  Airway Plan: Direct and Video, Post-Induction  Informed Consent: Informed consent signed with the Patient and all parties  understand the risks and agree with anesthesia plan.  All questions answered.   ASA Score: 1  Day of Surgery Review of History & Physical: H&P Update referred to the surgeon/provider.    Ready For Surgery From Anesthesia Perspective.     .

## 2022-08-18 NOTE — HPI
"Mr. Enzo Mejia is a 42 y.o. male with no POHx  who was transferred to [facility] [place] for evaluation and treatment of left eye injury. Patient is Estonian speaking and a video  was used for translation. Patient reports around 630 pm he dropped a glass bottle and something hit his left eye, he suspects it was glass from the broken bottle but it could've been the bottle cap. He felt immediate pain and vision changes like he was "looking through a cracked glass" in his left eye. No vision loss, flashes, floaters, curtain or veils over vision. Patient was taken ED in [place] where he was given Tdap, Levofloxacin, and CT orbit showed ruptured, deflated globe with no visible intraocular foreign body, and was transferred to [facility] [place]. Pain is currently 3/10 after receiving IV morphine. He denies any fevers, chills, HA, CP, SOB, cough, abdominal pain, N/V, C/D, urinary symptoms, weakness, numbness.     In ED, VSS. Ophthalmology consulted, plan for OR for repair of open globe. Started on IV Vanc and patient admitted to observation for ruptured globe.   "

## 2022-08-18 NOTE — BRIEF OP NOTE
BRIEF DISCHARGE NOTE:    Date of discharge: 08/18/2022    Reason for hospitalization -  Open globe surgery     Final Diagnosis - s/p open globe    Procedures and treatment provided - Status post open globe sx     Diet - Advance to regular as tolerated    Activity - as tolerated    Disposition at the end of the case - Good.    Discharge: to home    The patient tolerated the procedure well and knows to follow up with me tomorrow morning in the eye clinic, sooner if needed.    Patient and family instructions (as appropriate) - Given to patient on discharge    Luzmaria Christianson MD

## 2022-08-18 NOTE — SUBJECTIVE & OBJECTIVE
History reviewed. No pertinent past medical history.    History reviewed. No pertinent surgical history.    Review of patient's allergies indicates:   Allergen Reactions    Penicillins        Current Facility-Administered Medications on File Prior to Encounter   Medication    [COMPLETED] Tdap (BOOSTRIX) vaccine injection 0.5 mL    [DISCONTINUED] levoFLOXacin 750 mg/150 mL IVPB 750 mg     No current outpatient medications on file prior to encounter.     Family History    None       Tobacco Use    Smoking status: Not on file    Smokeless tobacco: Not on file   Substance and Sexual Activity    Alcohol use: Not on file    Drug use: Not on file    Sexual activity: Not on file     Review of Systems   Constitutional:  Negative for activity change, chills, diaphoresis, fever and unexpected weight change.   HENT:  Negative for congestion, nosebleeds and postnasal drip.    Eyes:  Positive for pain, redness and visual disturbance.   Respiratory:  Negative for cough, chest tightness, shortness of breath and wheezing.    Cardiovascular:  Negative for chest pain, palpitations and leg swelling.   Gastrointestinal:  Negative for abdominal distention, abdominal pain, constipation, diarrhea, nausea and vomiting.   Genitourinary:  Negative for difficulty urinating, dysuria, flank pain, frequency, hematuria and urgency.   Musculoskeletal:  Negative for arthralgias, back pain, gait problem and joint swelling.   Skin:  Negative for color change and pallor.   Neurological:  Negative for dizziness, syncope, weakness, light-headedness and headaches.   Psychiatric/Behavioral:  Negative for agitation, behavioral problems and confusion.    Objective:     Vital Signs (Most Recent):  Temp: 97.6 °F (36.4 °C) (08/17/22 2248)  Pulse: 84 (08/17/22 2248)  Resp: 18 (08/17/22 2339)  BP: 120/78 (08/17/22 2248)  SpO2: 99 % (08/17/22 2248) Vital Signs (24h Range):  Temp:  [97.6 °F (36.4 °C)-98.5 °F (36.9 °C)] 97.6 °F (36.4 °C)  Pulse:  [84-88] 84  Resp:   [18-20] 18  SpO2:  [99 %-100 %] 99 %  BP: (120-140)/(75-78) 120/78        There is no height or weight on file to calculate BMI.    Physical Exam  Vitals and nursing note reviewed.   Constitutional:       Appearance: Normal appearance. He is normal weight.   HENT:      Head: Normocephalic.      Nose: Nose normal. No congestion.      Mouth/Throat:      Mouth: Mucous membranes are moist.      Pharynx: Oropharynx is clear.   Eyes:      General:         Right eye: No discharge.      Comments: Left eye with shield in place   Cardiovascular:      Rate and Rhythm: Normal rate and regular rhythm.      Pulses: Normal pulses.      Heart sounds: Normal heart sounds.   Pulmonary:      Effort: Pulmonary effort is normal.      Breath sounds: Normal breath sounds. No wheezing, rhonchi or rales.   Chest:      Chest wall: No tenderness.   Abdominal:      General: Abdomen is flat. Bowel sounds are normal. There is no distension.      Palpations: Abdomen is soft.      Tenderness: There is no abdominal tenderness. There is no guarding.   Musculoskeletal:         General: No tenderness. Normal range of motion.      Cervical back: Normal range of motion and neck supple.      Right lower leg: No edema.      Left lower leg: No edema.   Skin:     General: Skin is warm and dry.   Neurological:      General: No focal deficit present.      Mental Status: He is alert and oriented to person, place, and time. Mental status is at baseline.      Cranial Nerves: No cranial nerve deficit.      Motor: No weakness.   Psychiatric:         Mood and Affect: Mood normal.         Behavior: Behavior normal.           Significant Labs: All pertinent labs within the past 24 hours have been reviewed.  Blood Culture: No results for input(s): LABBLOO in the last 48 hours.  CBC:   Recent Labs   Lab 08/17/22 2203 08/18/22  0119   WBC 8.58 7.85   HGB 13.7* 13.6*   HCT 39.2* 36.8*    255     CMP:   Recent Labs   Lab 08/17/22 2203 08/18/22  0119    138    K 3.6 3.7    106   CO2 26 22*   * 120*   BUN 16 14   CREATININE 0.9 0.8   CALCIUM 9.0 9.2   PROT  --  7.3   ALBUMIN  --  3.9   BILITOT  --  0.5   ALKPHOS  --  63   AST  --  22   ALT  --  13   ANIONGAP 10 10     Lactic Acid: No results for input(s): LACTATE in the last 48 hours.  Magnesium: No results for input(s): MG in the last 48 hours.  POCT Glucose: No results for input(s): POCTGLUCOSE in the last 48 hours.  Urine Studies: No results for input(s): COLORU, APPEARANCEUA, PHUR, SPECGRAV, PROTEINUA, GLUCUA, KETONESU, BILIRUBINUA, OCCULTUA, NITRITE, UROBILINOGEN, LEUKOCYTESUR, RBCUA, WBCUA, BACTERIA, SQUAMEPITHEL, HYALINECASTS in the last 48 hours.    Invalid input(s): WRIGHTSUR    Significant Imaging: I have reviewed all pertinent imaging results/findings within the past 24 hours.

## 2022-08-18 NOTE — PROGRESS NOTES
Pharmacokinetic Initial Assessment: IV Vancomycin    Assessment/Plan:    Initiate intravenous vancomycin with a maintenance dose of vancomycin 1000mg IV every 12 hours  Desired empiric serum trough concentration is 10 to 20 mcg/mL  Draw vancomycin trough level 60 min prior to fourth dose on 8/19 at approximately 1315  Pharmacy will continue to follow and monitor vancomycin.      Please contact pharmacy at extension [phone] with any questions regarding this assessment.     Thank you for the consult,   Nancy Bryan       Patient brief summary:  Enzo Mejia is a 42 y.o. male initiated on antimicrobial therapy with IV Vancomycin for treatment of suspected open globe    Drug Allergies:   Review of patient's allergies indicates:   Allergen Reactions    Penicillins            Renal Function:   CrCl cannot be calculated (Unknown ideal weight.).,     Dialysis Method (if applicable):  N/A    CBC (last 72 hours):  Recent Labs   Lab Result Units 08/17/22  2203   WBC K/uL 8.58   Hemoglobin g/dL 13.7*   Hematocrit % 39.2*   Platelets K/uL 262   Gran % % 69.4   Lymph % % 20.2   Mono % % 7.6   Eosinophil % % 1.6   Basophil % % 0.6   Differential Method  Automated       Metabolic Panel (last 72 hours):  Recent Labs   Lab Result Units 08/17/22  2203   Sodium mmol/L 141   Potassium mmol/L 3.6   Chloride mmol/L 105   CO2 mmol/L 26   Glucose mg/dL 119*   BUN mg/dL 16   Creatinine mg/dL 0.9       Drug levels (last 3 results):  No results for input(s): VANCOMYCINRA, VANCORANDOM, VANCOMYCINPE, VANCOPEAK, VANCOMYCINTR, VANCOTROUGH in the last 72 hours.    Microbiologic Results:  Microbiology Results (last 7 days)     ** No results found for the last 168 hours. **
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
## 2022-08-18 NOTE — DISCHARGE INSTRUCTIONS
Recs  - ok with discharge once recovered from anesthesia  - Keep patch in place overnight until seen in clinic tomorrow afternoon. No heavy lifting (over 10 lbs), bending, or straining. No direct water to eye.  - all drops/ointments will be given to patient in clinic tomorrow. He does not need to be discharged with any.   - Start Avelox 400 mg daily x 7 days, If not available start ciprofloxacin 500 mg BID x 7 days  - Ok with course of opioid pain medication, otherwise can alternate advil/tylenol for pain.   - Pt to be seen in Dr Christianson clinic tomorrow, 08/19 @ 1 PM. Clinic is located on 10th floor.

## 2022-08-18 NOTE — ANESTHESIA POSTPROCEDURE EVALUATION
Anesthesia Post Evaluation    Patient: Enzo Mejia    Procedure(s) Performed: Procedure(s) (LRB):  REPAIR, OPEN GLOBE (Left)    Final Anesthesia Type: general      Patient location during evaluation: PACU  Patient participation: Yes- Able to Participate  Level of consciousness: awake and alert, awake and oriented  Post-procedure vital signs: reviewed and stable  Pain management: adequate  Airway patency: patent    PONV status at discharge: No PONV  Anesthetic complications: no      Cardiovascular status: blood pressure returned to baseline, stable and hemodynamically stable  Respiratory status: unassisted, spontaneous ventilation and room air  Hydration status: euvolemic  Follow-up not needed.          Vitals Value Taken Time   /68 08/18/22 1302   Temp 36.5 °C (97.7 °F) 08/18/22 0907   Pulse 73 08/18/22 1311   Resp 16 08/18/22 1311   SpO2 99 % 08/18/22 1311   Vitals shown include unvalidated device data.      No case tracking events are documented in the log.      Pain/Nancy Score: Pain Rating Prior to Med Admin: 10 (8/18/2022 11:11 AM)  Nancy Score: 5 (8/18/2022  9:07 AM)

## 2022-08-18 NOTE — OP NOTE
DATE OF PROCEDURE: 08/18/2022    SURGEON: JOSE HOUSTON MD     ASSISTANT SETH GARCÍA MD    PREOPERATIVE DIAGNOSIS:  1. Open globe left eye  2. 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  Closure of open globe left eye  2. Weck cell anterior vitrectomy left eye.  3.  Closure of full thickness corneal laceration and 4. Closure of scleral laceration    ANESTHESIA:  geta    COMPLICATIONS: none    ESTIMATED BLOOD LOSS: <1cc    SPECIMENS: none    PROCEDURE IN DETAIL:  The patient was met in the preop holding area.  Consent was confirmed to be signed.  The operative site was marked.  The patient was brought into the operating room by the anesthesia team and placed under general anesthesia.  The left eye was prepped and draped in the sterile ophthalmic fashion.  Healon was injected through the full thickness corneal laceration to help reposit the iris and the extruded vitreous was cut with kobi scissors.  The extent of the corneal laceration was examined and noted to extend from the limbus at 5 oclock to the limbus at 12 oclock.   A conjunctival peritomy was performed superiorly and the scleral laceration was noted to extend posteriorly approximately 5 mm and curve nasally.  Several interrupted 10.0 nylon sutures were placed to re-approximate the cornea and sclera. The eye was filled with BSS to physiologic pressure and noted to be water tight. The conjunctiva was closed using 8.0 vicryl. Subconjunctival injection of ancef and decadron was placed in the inferior fornix.  A collagen shield soaked in vigamox and pred forte was placed in the eye.  The eye was washed, dried, and shielded.  The patient tolerated the procedure well and knows to follow up with me tomorrow morning, sooner if needed.

## 2022-08-18 NOTE — ED NOTES
Pt AAOx4, resting comfortably in bed, NAD, respirations E/UL, updated on POC, wheels locked and in low position, call bell with in reach, Comfort positioning and restroom needs were addressed. Necessary items were placed with in reach and was advised when a reassessment would take place.

## 2022-08-18 NOTE — PLAN OF CARE
Ophthalmology Plan of Care    Pt is s/p open globe repair morning of 08/18/22. Now recovering in PACU.     Recs  - ok with discharge once recovered from anesthesia  - Keep patch in place overnight until seen in clinic tomorrow afternoon. No heavy lifting (over 10 lbs), bending, or straining. No direct water to eye.  - all drops/ointments will be given to patient in clinic tomorrow. He does not need to be discharged with any.   - Start Avelox 400 mg daily x 7 days, If not available start ciprofloxacin 500 mg BID x 7 days  - Ok with course of opioid pain medication, otherwise can alternate advil/tylenol for pain.   - Pt to be seen in Dr Christianson clinic tomorrow, 08/19 @ 1 PM. Clinic is located on 10th floor.

## 2022-08-18 NOTE — HOSPITAL COURSE
Patient placed in observation for ruptured globe of left eye. Taken to the OR with ophthalmology for repair. Patient stable for discharge with oral abx with opthamology follow-up tomorrow on 8/19 with Dr. Christianson. Return precautions given and patient verbalized understanding. All questions answered.
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
## 2022-08-18 NOTE — H&P
Pre-operative History and Physical  Ophthalmology Service    CC: open globe, left eye    HPI:   Patient is a 42 y.o. male presents with an open globe requiring surgery as noted in the most recent ophthalmology progress note.    History reviewed. No pertinent past medical history.    History reviewed. No pertinent surgical history.    No family history on file.    Social History     Socioeconomic History    Marital status: Single       Current Facility-Administered Medications   Medication Dose Route Frequency Provider Last Rate Last Admin    acetaminophen tablet 1,000 mg  1,000 mg Oral TID Bailey Bo, PA-C        acetaminophen tablet 650 mg  650 mg Oral Q8H PRN Bailey Bo, PA-C        albuterol-ipratropium 2.5 mg-0.5 mg/3 mL nebulizer solution 3 mL  3 mL Nebulization Q4H PRN Bailey Bo, PA-C        aluminum-magnesium hydroxide-simethicone 200-200-20 mg/5 mL suspension 30 mL  30 mL Oral QID PRN Bailey Bo, PA-C        bisacodyL suppository 10 mg  10 mg Rectal Daily PRN Bailey Bo, PA-C        dextrose 10% bolus 125 mL  12.5 g Intravenous PRN Bailey Bo, PA-C        dextrose 10% bolus 250 mL  25 g Intravenous PRN Bailey Bo, PA-C        glucagon (human recombinant) injection 1 mg  1 mg Intramuscular PRN Bailey Bo, PA-C        glucose chewable tablet 16 g  16 g Oral PRN Bailey Bo, PA-C        glucose chewable tablet 24 g  24 g Oral PRN Bailey EJose Bo, PA-C        HYDROcodone-acetaminophen  mg per tablet 1 tablet  1 tablet Oral Q6H PRN Bailey Bo, PA-C        HYDROcodone-acetaminophen 5-325 mg per tablet 1 tablet  1 tablet Oral Q6H PRN Bailey Bo, PA-C        HYDROmorphone injection 0.5 mg  0.5 mg Intravenous Q4H PRN Bailey Bo, PA-C        melatonin tablet 9 mg  9 mg Oral Nightly PRN Bailey Bo, PA-C        naloxone 0.4 mg/mL injection 0.02 mg  0.02 mg Intravenous PRN Bailey Bo, PA-C         ondansetron disintegrating tablet 8 mg  8 mg Oral Q8H PRN Bailey Bo PA-C        polyethylene glycol packet 17 g  17 g Oral Daily Bailey Bo PA-C        simethicone chewable tablet 80 mg  1 tablet Oral QID PRN Bailey Bo PA-C        sodium chloride 0.9% flush 10 mL  10 mL Intravenous PRN Richard Begum MD        sodium chloride 0.9% flush 5 mL  5 mL Intravenous PRN Bailey Bo PA-C        vancomycin - pharmacy to dose   Intravenous pharmacy to manage frequency Castro Mckeon MD        vancomycin in dextrose 5 % 1 gram/250 mL IVPB 1,000 mg  1,000 mg Intravenous Q12H Castro Mckeon .7 mL/hr at 08/18/22 0148 1,000 mg at 08/18/22 0148     No current outpatient medications on file.       Review of patient's allergies indicates:   Allergen Reactions    Penicillins          Review of systems:  Gen: denies fevers, chills, nausea, vomitting, weight changes  HEENT: Denies discharge or coughing/sneezing. +blurry vision  Resp: Denies SOB  CV: Denies CP or palpitations  Abd: Denies tenderness, diarrhea, constipation, nausea  Ext:  Denies pain or edema    Physical Exam  BP: Vital signs stable  General: No apparent distress  HEENT: Normocephalic, atraumatic, + corneal-scleral laceration/open globe  Lungs: Adequate respirations, no respiratory distress  Heart: Pulses intact  Abdomen: Soft, no distention  Rectal/pelvic: Deferred    Assessment  Patient is a 42 y.o. male with open globe injury, left eye   - Risks/benefits/alternatives of the procedure including, but not limited to scarring, bleeding, infection, loss or decreased vision, and/or need for possible repeat surgery discussed with the patient and/or family, and Informed consent obtained prior to surgery and the patient/family voiced good understanding, witnessed, and placed in chart.  - Discussed with patient in detail that this is a salvage operation to close the eye and will likely not result in functional vision  - CT scan  revealing no intraocular foreign body, none seen on slit lamp as well  - Pt NPO, last PO intake ~ 630  - Pt s/p levaquin, tdap in ED  - Will proceed with open globe repair, left eye  - Plan for General anesthesia    Patient examined today.  No changes since last H&P written.

## 2022-08-18 NOTE — TRANSFER OF CARE
Anesthesia Transfer of Care Note    Patient: Enzo Mejia    Procedure(s) Performed: Procedure(s) (LRB):  REPAIR, OPEN GLOBE (Left)    Patient location: OPS    Anesthesia Type: general    Transport from OR: Transported from OR on room air with adequate spontaneous ventilation    Post pain: adequate analgesia    Post assessment: no apparent anesthetic complications and tolerated procedure well    Post vital signs: stable    Level of consciousness: awake, alert and oriented    Nausea/Vomiting: no nausea/vomiting    Complications: none    Transfer of care protocol was followed      Last vitals:   Visit Vitals  BP (!) 102/57 (BP Location: Left arm, Patient Position: Lying)   Pulse 74   Temp 36.5 °C (97.7 °F) (Temporal)   Resp 18   SpO2 100%

## 2022-08-19 ENCOUNTER — OFFICE VISIT (OUTPATIENT)
Dept: OPHTHALMOLOGY | Facility: CLINIC | Age: 42
End: 2022-08-19

## 2022-08-19 DIAGNOSIS — H43.12 VITREOUS HEMORRHAGE, LEFT: ICD-10-CM

## 2022-08-19 DIAGNOSIS — S05.32XD RUPTURED GLOBE OF LEFT EYE, SUBSEQUENT ENCOUNTER: Primary | ICD-10-CM

## 2022-08-19 DIAGNOSIS — H27.02 APHAKIA, LEFT: ICD-10-CM

## 2022-08-19 PROCEDURE — 99999 PR PBB SHADOW E&M-EST. PATIENT-LVL II: CPT | Mod: PBBFAC,,, | Performed by: OPHTHALMOLOGY

## 2022-08-19 PROCEDURE — 99024 POSTOP FOLLOW-UP VISIT: CPT | Mod: ,,, | Performed by: OPHTHALMOLOGY

## 2022-08-19 PROCEDURE — 99024 PR POST-OP FOLLOW-UP VISIT: ICD-10-PCS | Mod: ,,, | Performed by: OPHTHALMOLOGY

## 2022-08-19 PROCEDURE — 99212 OFFICE O/P EST SF 10 MIN: CPT | Mod: PBBFAC | Performed by: OPHTHALMOLOGY

## 2022-08-19 PROCEDURE — 99999 PR PBB SHADOW E&M-EST. PATIENT-LVL II: ICD-10-PCS | Mod: PBBFAC,,, | Performed by: OPHTHALMOLOGY

## 2022-08-19 NOTE — PROGRESS NOTES
HPI     ER f/up    S/p open globe repair OS 8/18/22  VH OS  Aphakia OS    Pt doing well. No pain.     Last edited by Luzmaria Christianson MD on 8/19/2022  2:28 PM. (History)            Assessment /Plan     For exam results, see Encounter Report.    Ruptured globe of left eye, subsequent encounter    Vitreous hemorrhage, left  -     B Scan    Aphakia, left        S/p open globe repair OS 8/18/22    Eye closed today, chamber formed    Bscan: VH OS    Clinically Aphakia OS    Start pf / vigamox QID, combigan BID    F/up 1 wk - va /iop OS    Monocular precautions - full time polycarb lenses to be worn at all times.

## 2022-08-22 NOTE — DISCHARGE SUMMARY
"Vikram Love - Surgery ([facility])  [facility] Medicine  Discharge Summary      Patient Name: Enzo Mejia  MRN: [id number]  Patient Class: OP- Observation  Admission Date: 8/17/2022  Hospital Length of Stay: 0 days  Discharge Date and Time: 8/18/2022  3:18 PM  Attending Physician: Alivia att. providers found   Discharging Provider: Kell Barton PA-C  Primary Care Provider: Primary Doctor No  Hospital Medicine Team: [facility] HOSP MED E Kell Barton PA-C    HPI:   Mr. Enzo Mejia is a 42 y.o. male with no POHx  who was transferred to [facility] for evaluation and treatment of left eye injury. Patient is Marshallese speaking and a video  was used for translation. Patient reports around 630 pm he dropped a glass bottle and something hit his left eye, he suspects it was glass from the broken bottle but it could've been the bottle cap. He felt immediate pain and vision changes like he was "looking through a cracked glass" in his left eye. No vision loss, flashes, floaters, curtain or veils over vision. Patient was taken ED in [place] where he was given Tdap, Levofloxacin, and CT orbit showed ruptured, deflated globe with no visible intraocular foreign body, and was transferred to [facility]. Pain is currently 3/10 after receiving IV morphine. He denies any fevers, chills, HA, CP, SOB, cough, abdominal pain, N/V, C/D, urinary symptoms, weakness, numbness.     In ED, VSS. Ophthalmology consulted, plan for OR for repair of open globe. Started on IV Vanc and patient admitted to observation for ruptured globe.       Procedure(s) (LRB):  REPAIR, OPEN GLOBE (Left)      Hospital Course:   Patient placed in observation for ruptured globe of left eye. Taken to the OR with ophthalmology for repair. Patient stable for discharge with oral abx with opthamology follow-up tomorrow on 8/19 with Dr. Christianson. Return precautions given and patient verbalized understanding. All questions answered.         Goals of " Care Treatment Preferences:  Code Status: Full Code      Consults:   Consults (From admission, onward)        Status Ordering Provider     Inpatient consult to Ophthalmology  Once        Provider:  (Not yet assigned)    Completed ODIN GONSALES          No new Assessment & Plan notes have been filed under this hospital service since the last note was generated.  Service: Hospital Medicine    There are no hospital problems to display for this patient.      Discharged Condition: good    Disposition: Home or Self Care    Follow Up:    Patient Instructions:      Diet Adult Regular     Lifting restrictions     Call MD for:  severe uncontrolled pain     Notify your health care provider if you experience any of the following:  temperature >100.4     Notify your health care provider if you experience any of the following:  severe uncontrolled pain     Notify your health care provider if you experience any of the following:  redness, tenderness, or signs of infection (pain, swelling, redness, odor or green/yellow discharge around incision site)     Weight bearing restrictions (specify):   Order Comments: No lifting above 10 lbs       Pending Diagnostic Studies:     None         Medications:  Reconciled Home Medications:      Medication List      START taking these medications    HYDROcodone-acetaminophen 5-325 mg per tablet  Commonly known as: NORCO  Monaca nena tableta por vía oral cada 6 horas según sea necesario para el dolor.  (Take 1 tablet by mouth every 6 (six) hours as needed for Pain.)     moxifloxacin 400 mg tablet  Commonly known as: AVELOX  Monaca 1 tableta (400 mg en total) por vía oral nena vez al día por 7 días  (Take 1 tablet (400 mg total) by mouth once daily. for 7 days)            Indwelling Lines/Drains at time of discharge:   Lines/Drains/Airways     Airway  Duration                Airway - Non-Surgical 08/18/22 0910 LMA 4 days                Time spent on the discharge of patient: 35 minutes         Kell  JAYLA Barton PA-C  Department of [facility] Medicine  [facility] - Surgery (1st Fl)

## 2022-08-25 ENCOUNTER — OFFICE VISIT (OUTPATIENT)
Dept: OPHTHALMOLOGY | Facility: CLINIC | Age: 42
End: 2022-08-25

## 2022-08-25 DIAGNOSIS — H43.12 VITREOUS HEMORRHAGE, LEFT: ICD-10-CM

## 2022-08-25 DIAGNOSIS — H27.02 APHAKIA, LEFT: ICD-10-CM

## 2022-08-25 DIAGNOSIS — S05.32XD RUPTURED GLOBE OF LEFT EYE, SUBSEQUENT ENCOUNTER: Primary | ICD-10-CM

## 2022-08-25 PROCEDURE — 99999 PR PBB SHADOW E&M-EST. PATIENT-LVL II: CPT | Mod: PBBFAC,,, | Performed by: OPHTHALMOLOGY

## 2022-08-25 PROCEDURE — 99024 PR POST-OP FOLLOW-UP VISIT: ICD-10-PCS | Mod: ,,, | Performed by: OPHTHALMOLOGY

## 2022-08-25 PROCEDURE — 99024 POSTOP FOLLOW-UP VISIT: CPT | Mod: ,,, | Performed by: OPHTHALMOLOGY

## 2022-08-25 PROCEDURE — 99999 PR PBB SHADOW E&M-EST. PATIENT-LVL II: ICD-10-PCS | Mod: PBBFAC,,, | Performed by: OPHTHALMOLOGY

## 2022-08-25 PROCEDURE — 99212 OFFICE O/P EST SF 10 MIN: CPT | Mod: PBBFAC | Performed by: OPHTHALMOLOGY

## 2022-08-25 RX ORDER — PREDNISOLONE ACETATE 10 MG/ML
1 SUSPENSION/ DROPS OPHTHALMIC 4 TIMES DAILY
Qty: 5 ML | Refills: 3 | Status: SHIPPED | OUTPATIENT
Start: 2022-08-25 | End: 2022-09-24

## 2022-08-26 NOTE — PROGRESS NOTES
HPI     ED referral     S/p open globe repair OS 8/18/22  VH OS  Aphakia OS    Pt presents to clinic for 1 week post op. Pt states OS feeling much   better. Seeing a lot of black spots floating around. Seeing flashes of   light. Also very light sensitive. Has FBS     Pred QID OS  Vigamox QID OS  Combigan BID OS       Last edited by Chiara Alexander on 8/25/2022  3:43 PM. (History)            Assessment /Plan     For exam results, see Encounter Report.    Ruptured globe of left eye, subsequent encounter    Vitreous hemorrhage, left    Aphakia, left    Other orders  -     prednisoLONE acetate (PRED FORTE) 1 % DrpS; Place 1 drop into the left eye 4 (four) times daily.  Dispense: 5 mL; Refill: 3        S/p open globe repair OS 8/18/22    Eye closed , chamber formed    Bscan: VH OS    Clinically Aphakia OS    Cont pf / vigamox (okay to stop when runs out) QID, combigan BID         Monocular precautions - full time polycarb lenses to be worn at all times.    Retina eval for VH, h/o OG, aphakia

## 2022-09-09 ENCOUNTER — TELEPHONE (OUTPATIENT)
Dept: OPHTHALMOLOGY | Facility: CLINIC | Age: 42
End: 2022-09-09

## 2022-09-09 ENCOUNTER — OFFICE VISIT (OUTPATIENT)
Dept: OPHTHALMOLOGY | Facility: CLINIC | Age: 42
End: 2022-09-09

## 2022-09-09 DIAGNOSIS — H43.12 VITREOUS HEMORRHAGE, LEFT: ICD-10-CM

## 2022-09-09 DIAGNOSIS — H27.02 APHAKIA, LEFT: ICD-10-CM

## 2022-09-09 DIAGNOSIS — S05.32XD RUPTURED GLOBE OF LEFT EYE, SUBSEQUENT ENCOUNTER: Primary | ICD-10-CM

## 2022-09-09 PROCEDURE — 99999 PR PBB SHADOW E&M-EST. PATIENT-LVL II: CPT | Mod: PBBFAC,,, | Performed by: OPHTHALMOLOGY

## 2022-09-09 PROCEDURE — 99212 OFFICE O/P EST SF 10 MIN: CPT | Mod: PBBFAC | Performed by: OPHTHALMOLOGY

## 2022-09-09 PROCEDURE — 99024 PR POST-OP FOLLOW-UP VISIT: ICD-10-PCS | Mod: S$PBB,,, | Performed by: OPHTHALMOLOGY

## 2022-09-09 PROCEDURE — 92134 CPTRZ OPH DX IMG PST SGM RTA: CPT | Mod: PBBFAC | Performed by: OPHTHALMOLOGY

## 2022-09-09 PROCEDURE — 99999 PR PBB SHADOW E&M-EST. PATIENT-LVL II: ICD-10-PCS | Mod: PBBFAC,,, | Performed by: OPHTHALMOLOGY

## 2022-09-09 PROCEDURE — 99024 POSTOP FOLLOW-UP VISIT: CPT | Mod: S$PBB,,, | Performed by: OPHTHALMOLOGY

## 2022-09-09 PROCEDURE — 92134 OCT, RETINA - OU - BOTH EYES: ICD-10-PCS | Mod: 26,S$PBB,, | Performed by: OPHTHALMOLOGY

## 2022-09-09 NOTE — PROGRESS NOTES
HPI    Cons per Dr Christianson for VH   Vision has gotten better. Drops,  Combigan 2X   OS  Pred  4Xos  Last edited by Regina Jacobs on 9/9/2022  2:41 PM.         A/P    ICD-10-CM ICD-9-CM   1. Ruptured globe of left eye, subsequent encounter  S05.32XD V58.89     871.0   2. Vitreous hemorrhage, left  H43.12 379.23   3. Aphakia, left  H27.02 379.31       1. Ruptured globe of left eye, subsequent encounter  2. Vitreous hemorrhage, left  3. Aphakia, left  S/p globe injury and repair 8/18/22 (glass bottle broke and piece hit left eye - no IOFB noted)  Initial VA was HM    Found on post-op visits to be aphakic and have VH on B scan    Today VA CF (can get to 20/200 with +10.5D lens), retina flat, old VH, no tears or detachment seen but limited view in focal areas temporally but no obvious tear or detachment, aphakic    Plan: discussed at length with patient and family about options. Discussed given aphakic nature and hemorrhage, would be better to do vitrectomy and check the periphery that way to ensure no pathology and if OK, can put scleral fixated IOL. Risks/benefits discussed and patient wishes to proceed, will aim for Sept 26th under local mac anesthesia. Will get CT She lens calcs today    Risks, benefits and alternatives to surgery and anesthesia including no treatment were discussed with the patient including: death, coma, blindness, bleeding, retinal detachment, cataract, need for more surgeries and glaucoma. The patient understands and accepts risks All questions were answered and the patient wishes to proceed with surgery    Recommend Pars Plana Vitrectomy / Secondary Intraocular lens Left Eye   R/B/A to surgery and anesthesia discussed with patient including: death, coma, blindness, bleeding, retinal detachment, cataract, and glaucoma Patient understands and accepts risks All questions answered Patient wishes to proceed with surgery      RTC post-op      I saw and examined the patient and reviewed in detail  the findings documented. The final examination findings, image interpretations, and plan as documented in the record represent my personal judgment and conclusions.    Ritesh Zaragoza MD  Vitreoretinal Surgery   [facility]

## 2022-09-23 ENCOUNTER — TELEPHONE (OUTPATIENT)
Dept: OPHTHALMOLOGY | Facility: CLINIC | Age: 42
End: 2022-09-23

## 2022-09-23 RX ORDER — PREDNISOLONE ACETATE 10 MG/ML
1 SUSPENSION/ DROPS OPHTHALMIC
Status: CANCELLED | OUTPATIENT
Start: 2022-09-23

## 2022-09-23 RX ORDER — CYCLOPENTOLATE HYDROCHLORIDE 10 MG/ML
1 SOLUTION/ DROPS OPHTHALMIC
Status: CANCELLED | OUTPATIENT
Start: 2022-09-23

## 2022-09-23 RX ORDER — PHENYLEPHRINE HYDROCHLORIDE 25 MG/ML
1 SOLUTION/ DROPS OPHTHALMIC
Status: CANCELLED | OUTPATIENT
Start: 2022-09-23

## 2022-09-23 RX ORDER — TETRACAINE HYDROCHLORIDE 5 MG/ML
1 SOLUTION OPHTHALMIC
Status: CANCELLED | OUTPATIENT
Start: 2022-09-23

## 2022-09-23 RX ORDER — MOXIFLOXACIN 5 MG/ML
1 SOLUTION/ DROPS OPHTHALMIC
Status: CANCELLED | OUTPATIENT
Start: 2022-09-23

## 2022-09-23 NOTE — PRE-PROCEDURE INSTRUCTIONS
PREOP INSTRUCTIONS:  No food,milk or milk products for 8 hours before surgery.  Clear liquids like water,gatorade,apple juice are allowed up until 2 hours before surgery.  Instructed to follow the surgeon's instructions if they differ from these.  Shower instructions as well as directions to the [facility] were given.  Encouraged to wear loose fitting,comfortable clothing.  Medication instructions for pm prior to and am of procedure reviewed.  Instructed to avoid taking vitamins,supplements,aspirin and ibuprofen the morning of surgery.    Patient denies any side effects or issues with anesthesia or sedation.

## 2022-09-26 ENCOUNTER — ANESTHESIA EVENT (OUTPATIENT)
Dept: SURGERY | Facility: HOSPITAL | Age: 42
End: 2022-09-26

## 2022-09-26 ENCOUNTER — ANESTHESIA (OUTPATIENT)
Dept: SURGERY | Facility: HOSPITAL | Age: 42
End: 2022-09-26

## 2022-09-26 ENCOUNTER — HOSPITAL ENCOUNTER (OUTPATIENT)
Facility: HOSPITAL | Age: 42
Discharge: HOME | End: 2022-09-26
Attending: OPHTHALMOLOGY | Admitting: OPHTHALMOLOGY

## 2022-09-26 VITALS
DIASTOLIC BLOOD PRESSURE: 57 MMHG | SYSTOLIC BLOOD PRESSURE: 105 MMHG | RESPIRATION RATE: 18 BRPM | TEMPERATURE: 98 F | OXYGEN SATURATION: 99 % | WEIGHT: 122 LBS | HEART RATE: 62 BPM

## 2022-09-26 DIAGNOSIS — H27.02 APHAKIA, LEFT: ICD-10-CM

## 2022-09-26 DIAGNOSIS — S05.32XD RUPTURED GLOBE OF LEFT EYE, SUBSEQUENT ENCOUNTER: ICD-10-CM

## 2022-09-26 DIAGNOSIS — H43.12 VITREOUS HEMORRHAGE, LEFT: ICD-10-CM

## 2022-09-26 PROCEDURE — V2632 POST CHMBR INTRAOCULAR LENS: HCPCS | Performed by: OPHTHALMOLOGY

## 2022-09-26 PROCEDURE — 71000015 HC POSTOP RECOV 1ST HR: Performed by: OPHTHALMOLOGY

## 2022-09-26 PROCEDURE — 99499 UNLISTED E&M SERVICE: CPT | Mod: ,,, | Performed by: STUDENT IN AN ORGANIZED HEALTH CARE EDUCATION/TRAINING PROGRAM

## 2022-09-26 PROCEDURE — 36000706: Performed by: OPHTHALMOLOGY

## 2022-09-26 PROCEDURE — 99499 NO LOS: ICD-10-PCS | Mod: ,,, | Performed by: STUDENT IN AN ORGANIZED HEALTH CARE EDUCATION/TRAINING PROGRAM

## 2022-09-26 PROCEDURE — 25000003 PHARM REV CODE 250: Performed by: REGISTERED NURSE

## 2022-09-26 PROCEDURE — 67036 PR VITRECTOMY,MECHANICAL: ICD-10-PCS | Mod: 79,LT,, | Performed by: OPHTHALMOLOGY

## 2022-09-26 PROCEDURE — 37000008 HC ANESTHESIA 1ST 15 MINUTES: Performed by: OPHTHALMOLOGY

## 2022-09-26 PROCEDURE — 27201423 OPTIME MED/SURG SUP & DEVICES STERILE SUPPLY: Performed by: OPHTHALMOLOGY

## 2022-09-26 PROCEDURE — 71000044 HC DOSC ROUTINE RECOVERY FIRST HOUR: Performed by: OPHTHALMOLOGY

## 2022-09-26 PROCEDURE — D9220A PRA ANESTHESIA: ICD-10-PCS | Mod: ,,, | Performed by: ANESTHESIOLOGY

## 2022-09-26 PROCEDURE — 36000707: Performed by: OPHTHALMOLOGY

## 2022-09-26 PROCEDURE — D9220A PRA ANESTHESIA: Mod: ,,, | Performed by: ANESTHESIOLOGY

## 2022-09-26 PROCEDURE — 66985 PR INSERT LENS PROSTHESIS ONLY: ICD-10-PCS | Mod: 79,51,LT, | Performed by: OPHTHALMOLOGY

## 2022-09-26 PROCEDURE — 63600175 PHARM REV CODE 636 W HCPCS: Performed by: REGISTERED NURSE

## 2022-09-26 PROCEDURE — 66985 INSERT LENS PROSTHESIS: CPT | Mod: 79,51,LT, | Performed by: OPHTHALMOLOGY

## 2022-09-26 PROCEDURE — C1784 OCULAR DEV, INTRAOP, DET RET: HCPCS | Performed by: OPHTHALMOLOGY

## 2022-09-26 PROCEDURE — 37000009 HC ANESTHESIA EA ADD 15 MINS: Performed by: OPHTHALMOLOGY

## 2022-09-26 PROCEDURE — 25000003 PHARM REV CODE 250: Performed by: OPHTHALMOLOGY

## 2022-09-26 PROCEDURE — 63600175 PHARM REV CODE 636 W HCPCS: Performed by: OPHTHALMOLOGY

## 2022-09-26 PROCEDURE — 67036 REMOVAL OF INNER EYE FLUID: CPT | Mod: 79,LT,, | Performed by: OPHTHALMOLOGY

## 2022-09-26 DEVICE — IMPLANTABLE DEVICE: Type: IMPLANTABLE DEVICE | Site: EYE | Status: FUNCTIONAL

## 2022-09-26 RX ORDER — LIDOCAINE HYDROCHLORIDE 20 MG/ML
INJECTION INTRAVENOUS
Status: DISCONTINUED | OUTPATIENT
Start: 2022-09-26 | End: 2022-09-26

## 2022-09-26 RX ORDER — VANCOMYCIN HYDROCHLORIDE 500 MG/10ML
INJECTION, POWDER, LYOPHILIZED, FOR SOLUTION INTRAVENOUS
Status: DISCONTINUED
Start: 2022-09-26 | End: 2022-09-26 | Stop reason: HOSPADM

## 2022-09-26 RX ORDER — MIDAZOLAM HYDROCHLORIDE 1 MG/ML
INJECTION, SOLUTION INTRAMUSCULAR; INTRAVENOUS
Status: DISCONTINUED | OUTPATIENT
Start: 2022-09-26 | End: 2022-09-26

## 2022-09-26 RX ORDER — FENTANYL CITRATE 50 UG/ML
25 INJECTION, SOLUTION INTRAMUSCULAR; INTRAVENOUS EVERY 5 MIN PRN
Status: DISCONTINUED | OUTPATIENT
Start: 2022-09-26 | End: 2022-09-26 | Stop reason: HOSPADM

## 2022-09-26 RX ORDER — TETRACAINE HYDROCHLORIDE 5 MG/ML
1 SOLUTION OPHTHALMIC
Status: DISCONTINUED | OUTPATIENT
Start: 2022-09-26 | End: 2022-09-26 | Stop reason: HOSPADM

## 2022-09-26 RX ORDER — NEOMYCIN SULFATE, POLYMYXIN B SULFATE, AND DEXAMETHASONE 3.5; 10000; 1 MG/G; [USP'U]/G; MG/G
OINTMENT OPHTHALMIC
Status: DISCONTINUED | OUTPATIENT
Start: 2022-09-26 | End: 2022-09-26 | Stop reason: HOSPADM

## 2022-09-26 RX ORDER — CYCLOPENTOLATE HYDROCHLORIDE 10 MG/ML
1 SOLUTION/ DROPS OPHTHALMIC
Status: DISCONTINUED | OUTPATIENT
Start: 2022-09-26 | End: 2022-09-26 | Stop reason: HOSPADM

## 2022-09-26 RX ORDER — ONDANSETRON 2 MG/ML
INJECTION INTRAMUSCULAR; INTRAVENOUS
Status: DISCONTINUED | OUTPATIENT
Start: 2022-09-26 | End: 2022-09-26

## 2022-09-26 RX ORDER — MOXIFLOXACIN 5 MG/ML
1 SOLUTION/ DROPS OPHTHALMIC
Status: DISCONTINUED | OUTPATIENT
Start: 2022-09-26 | End: 2022-09-26 | Stop reason: HOSPADM

## 2022-09-26 RX ORDER — DEXAMETHASONE SODIUM PHOSPHATE 4 MG/ML
INJECTION, SOLUTION INTRA-ARTICULAR; INTRALESIONAL; INTRAMUSCULAR; INTRAVENOUS; SOFT TISSUE
Status: DISCONTINUED | OUTPATIENT
Start: 2022-09-26 | End: 2022-09-26 | Stop reason: HOSPADM

## 2022-09-26 RX ORDER — PHENYLEPHRINE HYDROCHLORIDE 10 MG/ML
INJECTION INTRAVENOUS
Status: DISCONTINUED | OUTPATIENT
Start: 2022-09-26 | End: 2022-09-26

## 2022-09-26 RX ORDER — SODIUM CHLORIDE 0.9 % (FLUSH) 0.9 %
10 SYRINGE (ML) INJECTION
Status: DISCONTINUED | OUTPATIENT
Start: 2022-09-26 | End: 2022-09-26 | Stop reason: HOSPADM

## 2022-09-26 RX ORDER — PROPOFOL 10 MG/ML
VIAL (ML) INTRAVENOUS
Status: DISCONTINUED | OUTPATIENT
Start: 2022-09-26 | End: 2022-09-26

## 2022-09-26 RX ORDER — VANCOMYCIN HYDROCHLORIDE 500 MG/10ML
INJECTION, POWDER, LYOPHILIZED, FOR SOLUTION INTRAVENOUS
Status: DISCONTINUED | OUTPATIENT
Start: 2022-09-26 | End: 2022-09-26 | Stop reason: HOSPADM

## 2022-09-26 RX ORDER — BUPIVACAINE HYDROCHLORIDE 5 MG/ML
INJECTION, SOLUTION EPIDURAL; INTRACAUDAL
Status: DISCONTINUED | OUTPATIENT
Start: 2022-09-26 | End: 2022-09-26 | Stop reason: HOSPADM

## 2022-09-26 RX ORDER — MOXIFLOXACIN 5 MG/ML
SOLUTION/ DROPS OPHTHALMIC
Status: DISCONTINUED | OUTPATIENT
Start: 2022-09-26 | End: 2022-09-26 | Stop reason: HOSPADM

## 2022-09-26 RX ORDER — PREDNISOLONE ACETATE 10 MG/ML
1 SUSPENSION/ DROPS OPHTHALMIC
Status: DISCONTINUED | OUTPATIENT
Start: 2022-09-26 | End: 2022-09-26 | Stop reason: HOSPADM

## 2022-09-26 RX ORDER — DEXAMETHASONE SODIUM PHOSPHATE 4 MG/ML
INJECTION, SOLUTION INTRA-ARTICULAR; INTRALESIONAL; INTRAMUSCULAR; INTRAVENOUS; SOFT TISSUE
Status: DISCONTINUED | OUTPATIENT
Start: 2022-09-26 | End: 2022-09-26

## 2022-09-26 RX ORDER — TRIAMCINOLONE ACETONIDE 40 MG/ML
INJECTION, SUSPENSION INTRA-ARTICULAR; INTRAMUSCULAR
Status: DISCONTINUED
Start: 2022-09-26 | End: 2022-09-26 | Stop reason: HOSPADM

## 2022-09-26 RX ORDER — PROPOFOL 10 MG/ML
VIAL (ML) INTRAVENOUS CONTINUOUS PRN
Status: DISCONTINUED | OUTPATIENT
Start: 2022-09-26 | End: 2022-09-26

## 2022-09-26 RX ORDER — DEXAMETHASONE SODIUM PHOSPHATE 4 MG/ML
INJECTION, SOLUTION INTRA-ARTICULAR; INTRALESIONAL; INTRAMUSCULAR; INTRAVENOUS; SOFT TISSUE
Status: DISCONTINUED
Start: 2022-09-26 | End: 2022-09-26 | Stop reason: HOSPADM

## 2022-09-26 RX ORDER — LIDOCAINE HYDROCHLORIDE 20 MG/ML
INJECTION, SOLUTION EPIDURAL; INFILTRATION; INTRACAUDAL; PERINEURAL
Status: DISCONTINUED | OUTPATIENT
Start: 2022-09-26 | End: 2022-09-26 | Stop reason: HOSPADM

## 2022-09-26 RX ORDER — EPINEPHRINE 1 MG/ML
INJECTION, SOLUTION INTRACARDIAC; INTRAMUSCULAR; INTRAVENOUS; SUBCUTANEOUS
Status: DISCONTINUED | OUTPATIENT
Start: 2022-09-26 | End: 2022-09-26 | Stop reason: HOSPADM

## 2022-09-26 RX ORDER — NEOMYCIN SULFATE, POLYMYXIN B SULFATE, AND DEXAMETHASONE 3.5; 10000; 1 MG/G; [USP'U]/G; MG/G
OINTMENT OPHTHALMIC
Status: DISCONTINUED
Start: 2022-09-26 | End: 2022-09-26 | Stop reason: HOSPADM

## 2022-09-26 RX ORDER — PHENYLEPHRINE HYDROCHLORIDE 25 MG/ML
1 SOLUTION/ DROPS OPHTHALMIC
Status: DISCONTINUED | OUTPATIENT
Start: 2022-09-26 | End: 2022-09-26 | Stop reason: HOSPADM

## 2022-09-26 RX ORDER — FENTANYL CITRATE 50 UG/ML
INJECTION, SOLUTION INTRAMUSCULAR; INTRAVENOUS
Status: DISCONTINUED | OUTPATIENT
Start: 2022-09-26 | End: 2022-09-26

## 2022-09-26 RX ORDER — EPINEPHRINE 1 MG/ML
INJECTION, SOLUTION INTRACARDIAC; INTRAMUSCULAR; INTRAVENOUS; SUBCUTANEOUS
Status: DISCONTINUED
Start: 2022-09-26 | End: 2022-09-26 | Stop reason: HOSPADM

## 2022-09-26 RX ADMIN — MOXIFLOXACIN OPHTHALMIC 1 DROP: 5 SOLUTION/ DROPS OPHTHALMIC at 08:09

## 2022-09-26 RX ADMIN — PHENYLEPHRINE HYDROCHLORIDE 1 DROP: 25 SOLUTION/ DROPS OPHTHALMIC at 08:09

## 2022-09-26 RX ADMIN — PREDNISOLONE ACETATE 1 DROP: 10 SUSPENSION/ DROPS OPHTHALMIC at 08:09

## 2022-09-26 RX ADMIN — Medication 50 MG: at 08:09

## 2022-09-26 RX ADMIN — DEXAMETHASONE SODIUM PHOSPHATE 4 MG: 4 INJECTION INTRA-ARTICULAR; INTRALESIONAL; INTRAMUSCULAR; INTRAVENOUS; SOFT TISSUE at 08:09

## 2022-09-26 RX ADMIN — PROPOFOL 50 MCG/KG/MIN: 10 INJECTION, EMULSION INTRAVENOUS at 08:09

## 2022-09-26 RX ADMIN — MIDAZOLAM 2 MG: 1 INJECTION INTRAMUSCULAR; INTRAVENOUS at 08:09

## 2022-09-26 RX ADMIN — CYCLOPENTOLATE HYDROCHLORIDE 1 DROP: 10 SOLUTION/ DROPS OPHTHALMIC at 08:09

## 2022-09-26 RX ADMIN — TETRACAINE HYDROCHLORIDE 1 DROP: 5 SOLUTION OPHTHALMIC at 08:09

## 2022-09-26 RX ADMIN — LIDOCAINE HYDROCHLORIDE 75 MG: 20 INJECTION INTRAVENOUS at 08:09

## 2022-09-26 RX ADMIN — FENTANYL CITRATE 25 MCG: 0.05 INJECTION, SOLUTION INTRAMUSCULAR; INTRAVENOUS at 08:09

## 2022-09-26 RX ADMIN — SODIUM CHLORIDE: 0.9 INJECTION, SOLUTION INTRAVENOUS at 08:09

## 2022-09-26 RX ADMIN — ONDANSETRON 4 MG: 2 INJECTION INTRAMUSCULAR; INTRAVENOUS at 08:09

## 2022-09-26 RX ADMIN — PHENYLEPHRINE HYDROCHLORIDE 100 MCG: 10 INJECTION INTRAVENOUS at 09:09

## 2022-09-26 RX ADMIN — Medication 40 MG: at 08:09

## 2022-09-26 NOTE — ANESTHESIA POSTPROCEDURE EVALUATION
Anesthesia Post Evaluation    Patient: Enzo Mejia    Procedure(s) Performed: Procedure(s) (LRB):  VITRECTOMY, PARS PLANA APPROACH (Left)    Final Anesthesia Type: general      Patient location during evaluation: PACU  Patient participation: Yes- Able to Participate  Level of consciousness: awake and alert, awake and oriented  Post-procedure vital signs: reviewed and stable  Pain management: adequate  Airway patency: patent    PONV status at discharge: No PONV  Anesthetic complications: no      Cardiovascular status: blood pressure returned to baseline, stable and hemodynamically stable  Respiratory status: unassisted, spontaneous ventilation and room air  Hydration status: euvolemic  Follow-up not needed.          Vitals Value Taken Time   /57 09/26/22 1031   Temp 36.7 °C (98.1 °F) 09/26/22 1015   Pulse 71 09/26/22 1040   Resp 44 09/26/22 1039   SpO2 100 % 09/26/22 1040   Vitals shown include unvalidated device data.      No case tracking events are documented in the log.      Pain/Nancy Score: Nancy Score: 10 (9/26/2022 10:15 AM)

## 2022-09-26 NOTE — OP NOTE
PATIENT NAME: Enzo Mejia      MEDICAL RECORD NUMBER: [id number]     Date of Surgery 9/26/2022    ATTENDING SURGEON: Ritesh Zaragoza MD (A)    ASSISTANT SURGEON: Castro Hamilton MD PhD (F)    PREOPERATIVE DIAGNOSIS:  Aphakia of the Left eye.  Vitreous hemorrhage of left eye    OPERATION:  Pars plana vitrectomy and secondary intraocular lens implant (Modified Liz Technique) of the Left eye.    POSTOPERATIVE DIAGNOSIS:  Same    ANESTHESIA:  Monitored anesthesia care    COMPLICATIONS:  None.    SPECIMENS:  None.    EBL:  Minimal    Implant  CT She +19.5      Indications for Surgery  Enzo Mejia  is a 42 y.o. year old presenting after recent globe repair trauma with decreased vision and findings of vitreous hemorrhage and aphakia in the Left eye. The recommendation was for surgical repair to ensure the retina was attached. After the risks, benefits and alternatives were discussed with Enzo Mejia the patient consented to the procedure and was scheduled for surgery.     Description of Procedure:    The patient, Enzo Mejia gave informed consent for the following operation which was  performed under Monitored anesthesia care. The patient was examined with an indirect ophthalmoscope and we decided to proceed with surgery. A time out was performed and confirmed by all in the room that the Left eye was the correct operative eye. Following an initial infusion of propofol, the patient was given a retrobulbar injection of an equal mixture of 0.75% Marcaine and 4% lidocaine and then the eye was prepped and draped in the usual sterile fashion for vitrectomy surgery.     The cornea was kept moist with OVD placed on the surface throughout the operation. A 25 gauge vitrectomy system was used. Initial entry into the eye was gained with 3 trocars placed 3 mm from the limbus. In the inferotemporal location, the infusion cannula was  secured in position. The open end of the cannula was visualized through the dilated pupil and the infusion was turned on when it was in the correct position. The 2 superior sclerotomies were used for entry of a light pipe and a vitrectomy cutter. Next, a core vitrectomy was conducted to remove the vitreous hemorrhage. The posterior hyaloid was noted to be elevated, followed by peripheral vitrectomy out towards the vitreous base.  We inspected the retinal periphery with scleral depression. No breaks were noted      Focal peritomy was created temporal and nasal. The cornea was marked 180 degrees apart at the limbus at 3 and 9oclock, then the conjunctiva and sclera was marked in this axis 2.5mm posterior to the limbus. A 25 gauge cannula was inserted through the sclera entering the eye at this scot temporally and in a parallel manner this was repeated nasal.     A 3 plane corneal incision was made superiorly. OVD was injected into the anterior chamber. The intraocular lens was injected into the eye through the main incision with the trailing haptic hanging out of the eye. Using intraocular forceps the leading haptic was externalized temporally. The superior haptic was similarly externalized. Focal scleral tunnels were made nasal and temporal and the haptics were placed in these tunnels flush with the sclera. The lens remained centered without tilt. The periphery was examined with scleral depression, no tears were noted.     The corneal incision was sutured and trocars were removed and sutured watertight. The intraocular pressure remained normal. Betadine was place on the eye. Vancomycin and Decadron were injected into the sub-Tenon space. The eye was irrigated with BSS. A drop of Maxitrol ointment were placed on the eye, which was then patched and shielded. There were no complications.    The patient returned to the PACU in stable condition and will follow up tomorrow in the eye clinic.

## 2022-09-26 NOTE — PATIENT INSTRUCTIONS
Post-Operative Instructions:    - Keep eye shield & dressing in place until we remove it tomorrow in eye clinic.  - It is all right to watch television or to read.   - Tylenol as needed for general discomfort.  - Maintain head in a head up position  - Keep your face out of water for five days after surgery - NO SHOWERS.  - No excessive exercise.    - No bending, lifting or straining.   - Call MD if significant pain or nausea / vomiting uncontrolled by medications  - Call MD if temperature in excess of 101' F  - Return to eye clinic for post op examination tomorrow morning.  - Bring medicine bag to tomorrow's appointment.    FOR EMERGENCIES:  If any unusual problems or difficulties occur, contact Dr. Hamilton or the eye resident on call at the hospital main line

## 2022-09-26 NOTE — ANESTHESIA PREPROCEDURE EVALUATION
09/26/2022  Enzo Mejia is a 42 y.o., male.      Pre-op Assessment    I have reviewed the Patient Summary Reports.     I have reviewed the Nursing Notes. I have reviewed the NPO Status.   I have reviewed the Medications.     Review of Systems  Anesthesia Hx:  No problems with previous Anesthesia  Neg history of prior surgery. Denies Family Hx of Anesthesia complications.   Denies Personal Hx of Anesthesia complications.   Social:  Non-Smoker, No Alcohol Use    Hematology/Oncology:  Hematology Normal   Oncology Normal     EENT/Dental:EENT/Dental Normal   Cardiovascular:  Cardiovascular Normal Exercise tolerance: good     Pulmonary:  Pulmonary Normal    Renal/:  Renal/ Normal     Hepatic/GI:  Hepatic/GI Normal    Musculoskeletal:  Musculoskeletal Normal    Neurological:  Neurology Normal    Endocrine:  Endocrine Normal    Dermatological:  Skin Normal    Psych:  Psychiatric Normal           Physical Exam  General: Well nourished, Cooperative, Alert and Oriented    Airway:  Mallampati: III / II  Mouth Opening: Normal  TM Distance: Normal  Tongue: Normal  Neck ROM: Normal ROM    Dental:  Intact    Chest/Lungs:  Clear to auscultation, Normal Respiratory Rate    Heart:  Rate: Normal  Rhythm: Regular Rhythm  Sounds: Normal        Anesthesia Plan  Type of Anesthesia, risks & benefits discussed:    Anesthesia Type: Gen Natural Airway  Intra-op Monitoring Plan: Standard ASA Monitors  Post Op Pain Control Plan: multimodal analgesia  Induction:  IV  Airway Plan: Direct  Informed Consent: Informed consent signed with the Patient and all parties understand the risks and agree with anesthesia plan.  All questions answered. Patient consented to blood products? No  ASA Score: 3  Day of Surgery Review of History & Physical: H&P Update referred to the surgeon/provider.    Ready For Surgery From  Anesthesia Perspective.     .

## 2022-09-26 NOTE — BRIEF OP NOTE
Brief Operative Note  Ophthalmology     Pre-Op Dx: aphakia, vitreous hemorrhage left eye    Post Op Dx: same     Procedure Performed: vitrectomy, secondary lens implant    Attending Surgeon: Ritesh Zaragoza MD     Assistant Surgeon: Castro Hamilton MD    Anesthesia: Local/Mac, retrobulbar injection of 10cc mixture 0.75%Marcaine, 2% Xylocaine    Estimated blood loss: Minimal    Complications: None    Specimen: None    Disposition: Stable to recovery    Findings/Outcome: retina attached, lens placed    Date of Discharge: today 9/26/2022     Discharge Disposition: home    F/U: tomorrow eye clinic with Dr Zaragoza

## 2022-09-26 NOTE — H&P
Pre-operative History and Physical  Ophthalmology Service    CC: poor vision left eye    HPI:   Patient is a 42 y.o. male presents with an eye problem of aphakia and vitreous hemorrhage s/p ruptured globe left eye requiring surgery as noted in the most recent ophthalmology progress note.    History reviewed. No pertinent past medical history.    Past Surgical History:   Procedure Laterality Date    REPAIR OF OPEN GLOBE Left 8/18/2022    Procedure: REPAIR, OPEN GLOBE;  Surgeon: Luzmaria Christianson MD;  Location: [facility] OR [address] [place] [address];  Service: Ophthalmology;  Laterality: Left;       History reviewed. No pertinent family history.    Social History     Socioeconomic History    Marital status: Single       Current Facility-Administered Medications   Medication Dose Route Frequency Provider Last Rate Last Admin    cyclopentolate 1% ophthalmic solution 1 drop  1 drop Left Eye On Call Procedure Ritesh Zaragoza MD        moxifloxacin 0.5 % ophthalmic solution 1 drop  1 drop Left Eye On Call Procedure Ritesh Zaragoza MD        phenylephrine HCL 2.5% ophthalmic solution 1 drop  1 drop Left Eye On Call Procedure Ritesh Zaragoza MD        prednisoLONE acetate 1 % ophthalmic suspension 1 drop  1 drop Left Eye On Call Procedure Ritesh Zaragoza MD        TETRAcaine HCl (PF) 0.5 % Drop 1 drop  1 drop Left Eye On Call Procedure Ritesh Zaragoza MD           Review of patient's allergies indicates:   Allergen Reactions    Penicillins Anaphylaxis         Review of systems:  Gen: denies fevers, chills, nausea, vomitting, weight changes  HEENT: Denies discharge or coughing/sneezing. +blurry vision left eye  Resp: Denies SOB  CV: Denies CP or palpitations  Abd: Denies tenderness, diarrhea, constipation, nausea  Ext:  Denies pain or edema    Physical Exam  BP: Vital signs stable  General: No apparent distress  HEENT: Normocephalic, atraumatic, see last ophthalmology progress note  Lungs: Adequate respirations, no respiratory distress  Heart: Pulses  intact  Abdomen: Soft, no distention  Rectal/pelvic: Deferred    Assessment  Patient is a 42 y.o. male with eye problem with aphakia left eye, s/p ruptured globe with vitreous hemorrhage left eye, in need of surgery.   - Risks/benefits/alternatives of the procedure including, but not limited to scarring, bleeding, infection, loss or decreased vision, and/or need for possible repeat surgery discussed with the patient and/or family, and Informed consent obtained prior to surgery and the patient/family voiced good understanding, witnessed, and placed in chart.  - Will proceed with Pars Plana Vitrectomy / Secondary Intraocular lens Left Eye   - Plan for local/MAC anesthesia   - see status of aspirin and other blood thinners most recent note

## 2022-09-26 NOTE — TRANSFER OF CARE
Anesthesia Transfer of Care Note    Patient: Enzo Mejia    Procedure(s) Performed: Procedure(s) (LRB):  VITRECTOMY, PARS PLANA APPROACH (Left)    Patient location: PACU    Anesthesia Type: MAC    Transport from OR: Transported from OR on room air with adequate spontaneous ventilation    Post pain: adequate analgesia    Post assessment: no apparent anesthetic complications and tolerated procedure well    Post vital signs: stable    Level of consciousness: awake, alert and oriented    Nausea/Vomiting: no nausea/vomiting    Complications: none    Transfer of care protocol was followed      Last vitals:   Visit Vitals  BP (!) 102/59 (BP Location: Left arm, Patient Position: Lying)   Pulse 69   Temp 36.7 °C (98.1 °F) (Temporal)   Resp 18   Wt 55.3 kg (122 lb)   SpO2 100%

## 2022-09-27 ENCOUNTER — OFFICE VISIT (OUTPATIENT)
Dept: OPHTHALMOLOGY | Facility: CLINIC | Age: 42
End: 2022-09-27

## 2022-09-27 DIAGNOSIS — H27.02 APHAKIA, LEFT: ICD-10-CM

## 2022-09-27 DIAGNOSIS — H43.12 VITREOUS HEMORRHAGE, LEFT: ICD-10-CM

## 2022-09-27 DIAGNOSIS — S05.32XD RUPTURED GLOBE OF LEFT EYE, SUBSEQUENT ENCOUNTER: Primary | ICD-10-CM

## 2022-09-27 PROCEDURE — 99024 POSTOP FOLLOW-UP VISIT: CPT | Mod: ,,, | Performed by: OPHTHALMOLOGY

## 2022-09-27 PROCEDURE — 99024 PR POST-OP FOLLOW-UP VISIT: ICD-10-PCS | Mod: ,,, | Performed by: OPHTHALMOLOGY

## 2022-09-27 NOTE — PROGRESS NOTES
HPI    POD1 OS ruptured globe repair   Pt doing well. No pain but does feel FBS in OS.   Last edited by Deepti Sanz on 9/27/2022  9:09 AM.         A/P    ICD-10-CM ICD-9-CM   1. Ruptured globe of left eye, subsequent encounter  S05.32XD V58.89     871.0   2. Vitreous hemorrhage, left  H43.12 379.23   3. Aphakia, left  H27.02 379.31     1. Ruptured globe of left eye, subsequent encounter  2. Vitreous hemorrhage, left  3. Aphakia, left  S/p globe injury and repair 8/18/22 (glass bottle broke and piece hit left eye - no IOFB noted)  Initial VA was HM    Found on post-op visits to be aphakic and have VH on B scan    Pre-Op VA CF (can get to 20/200 with +10.5D lens), retina flat, old VH, no tears or detachment seen but limited view in focal areas temporally but no obvious tear or detachment, aphakic    Postop: s/p PPV/2ndary IOL (Date 9/26/22 by Mila/Joy) for NCVH, Globe trauma, Aphakia, Vitreous Prolapse  Positioning: Upright  Duration: 7 days    9/27/2022 VA CF (stable), lens well centered no tilt, mod DM folds and corneal edema, retina flat and attached intra-op after globe trauma    Instructions reviewed   - RD precautions  - Return for increasing pain/decreasing vision  - No getting the eye wet, no rubbing the eye, no heavy lifting for 1 month after surgery  Drops:  Vigamox  - 4   Pred - 4(start 9/27/2022)  Maxitrol - QHS     RTC 1 week DFE OS    I saw and examined the patient and reviewed in detail the findings documented. The final examination findings, image interpretations, and plan as documented in the record represent my personal judgment and conclusions.    Ritesh Zaragoza MD  Vitreoretinal Surgery   [facility]

## 2022-10-04 ENCOUNTER — OFFICE VISIT (OUTPATIENT)
Dept: OPHTHALMOLOGY | Facility: CLINIC | Age: 42
End: 2022-10-04

## 2022-10-04 DIAGNOSIS — H43.12 VITREOUS HEMORRHAGE, LEFT: ICD-10-CM

## 2022-10-04 DIAGNOSIS — S05.32XD RUPTURED GLOBE OF LEFT EYE, SUBSEQUENT ENCOUNTER: Primary | ICD-10-CM

## 2022-10-04 DIAGNOSIS — H27.02 APHAKIA, LEFT: ICD-10-CM

## 2022-10-04 PROCEDURE — 99999 PR PBB SHADOW E&M-EST. PATIENT-LVL I: ICD-10-PCS | Mod: PBBFAC,,, | Performed by: OPHTHALMOLOGY

## 2022-10-04 PROCEDURE — 99024 PR POST-OP FOLLOW-UP VISIT: ICD-10-PCS | Mod: ,,, | Performed by: OPHTHALMOLOGY

## 2022-10-04 PROCEDURE — 99024 POSTOP FOLLOW-UP VISIT: CPT | Mod: ,,, | Performed by: OPHTHALMOLOGY

## 2022-10-04 PROCEDURE — 99211 OFF/OP EST MAY X REQ PHY/QHP: CPT | Mod: PBBFAC | Performed by: OPHTHALMOLOGY

## 2022-10-04 PROCEDURE — 99999 PR PBB SHADOW E&M-EST. PATIENT-LVL I: CPT | Mod: PBBFAC,,, | Performed by: OPHTHALMOLOGY

## 2022-10-04 NOTE — PROGRESS NOTES
HPI    POW1 OS ruptured globe repair   Pt doing well. No pain but does feel FBS in OS sometimes     Vig   Pred   Maxitrol roxy  Last edited by Deepti Sanz on 10/4/2022  9:24 AM.            A/P    ICD-10-CM ICD-9-CM   1. Ruptured globe of left eye, subsequent encounter  S05.32XD V58.89     871.0   2. Vitreous hemorrhage, left  H43.12 379.23   3. Aphakia, left  H27.02 379.31       1. Ruptured globe of left eye, subsequent encounter  2. Vitreous hemorrhage, left  3. Aphakia, left  S/p globe injury and repair 8/18/22 (glass bottle broke and piece hit left eye - no IOFB noted)  Initial VA was HM    Found on post-op visits to be aphakic and have VH on B scan    Pre-Op VA CF (can get to 20/200 with +10.5D lens), retina flat, old VH, no tears or detachment seen but limited view in focal areas temporally but no obvious tear or detachment, aphakic    Postop: s/p PPV/2ndary IOL (Date 9/26/22 by Mila/Joy) for NCVH, Globe trauma, Aphakia, Vitreous Prolapse    10/4/2022 VA 20/200 (was CF), lens well centered no tilt, mod DM folds and corneal edema, improving retina flat and attached intra-op after globe trauma    Will refer back to Dr Christianson in coming weeks for f/u    Instructions reviewed   - RD precautions  - Return for increasing pain/decreasing vision  - No getting the eye wet, no rubbing the eye, no heavy lifting for 1 month after surgery  Drops:  Vigamox  - 4 -stop  Pred - 4(start weekly taper  Maxitrol - QHS -stop     RTC Mila POM1 DFE OS  RTC Sammy 1 mo Cornea check    I saw and examined the patient and reviewed in detail the findings documented. The final examination findings, image interpretations, and plan as documented in the record represent my personal judgment and conclusions.    Ritesh Zaragoza MD  Vitreoretinal Surgery   [facility]

## 2022-11-01 ENCOUNTER — OFFICE VISIT (OUTPATIENT)
Dept: OPHTHALMOLOGY | Facility: CLINIC | Age: 42
End: 2022-11-01

## 2022-11-01 DIAGNOSIS — S05.32XD RUPTURED GLOBE OF LEFT EYE, SUBSEQUENT ENCOUNTER: Primary | ICD-10-CM

## 2022-11-01 DIAGNOSIS — H43.12 VITREOUS HEMORRHAGE, LEFT: ICD-10-CM

## 2022-11-01 DIAGNOSIS — H27.02 APHAKIA, LEFT: ICD-10-CM

## 2022-11-01 PROCEDURE — 99024 PR POST-OP FOLLOW-UP VISIT: ICD-10-PCS | Mod: ,,, | Performed by: OPHTHALMOLOGY

## 2022-11-01 PROCEDURE — 99999 PR PBB SHADOW E&M-EST. PATIENT-LVL II: CPT | Mod: PBBFAC,,, | Performed by: OPHTHALMOLOGY

## 2022-11-01 PROCEDURE — 99024 POSTOP FOLLOW-UP VISIT: CPT | Mod: ,,, | Performed by: OPHTHALMOLOGY

## 2022-11-01 PROCEDURE — 99999 PR PBB SHADOW E&M-EST. PATIENT-LVL II: ICD-10-PCS | Mod: PBBFAC,,, | Performed by: OPHTHALMOLOGY

## 2022-11-01 PROCEDURE — 99212 OFFICE O/P EST SF 10 MIN: CPT | Mod: PBBFAC | Performed by: OPHTHALMOLOGY

## 2022-11-01 NOTE — PROGRESS NOTES
HPI    1 month PO OS  Adriana pt cousin with him in clinic today. Advised he would prefer her to   translate.    Pt states his va is stable to improved since last visit. Occasional pain   but not bad.  Stopped gtts as advised. Pt c/o of film over va OS.       Last edited by Sammi Rivera on 11/1/2022  9:17 AM.            A/P    ICD-10-CM ICD-9-CM   1. Ruptured globe of left eye, subsequent encounter  S05.32XD V58.89     871.0   2. Vitreous hemorrhage, left  H43.12 379.23   3. Aphakia, left  H27.02 379.31     1. Ruptured globe of left eye, subsequent encounter  2. Vitreous hemorrhage, left  3. Aphakia, left  S/p globe injury and repair 8/18/22 (glass bottle broke and piece hit left eye - no IOFB noted)  Initial VA was HM    Found on post-op visits to be aphakic and have VH on B scan    Pre-Op VA CF (can get to 20/200 with +10.5D lens), retina flat, old VH, no tears or detachment seen but limited view in focal areas temporally but no obvious tear or detachment, aphakic    Postop: s/p PPV/2ndary IOL (Date 9/26/22 by Mila/Joy) for NCVH, Globe trauma, Aphakia, Vitreous Prolapse    11/1/2022 VA 20/100 (was 20/200), lens well centered no tilt, DM folds and corneal edema better, retina flat and attached intra-op after globe trauma    Will refer back to Dr Christianson in coming week for f/u for corneal sutures    Instructions reviewed   - RD precautions  - Return for increasing pain/decreasing vision  - No getting the eye wet, no rubbing the eye, no heavy lifting for 1 month after surgery       RTC Mila 2 mo DFE OS  RTC Sammy  as scheduled cornea check    I saw and examined the patient and reviewed in detail the findings documented. The final examination findings, image interpretations, and plan as documented in the record represent my personal judgment and conclusions.    Ritesh Zaragoza MD  Vitreoretinal Surgery   [facility]

## 2022-11-10 ENCOUNTER — OFFICE VISIT (OUTPATIENT)
Dept: OPHTHALMOLOGY | Facility: CLINIC | Age: 42
End: 2022-11-10

## 2022-11-10 DIAGNOSIS — S05.32XD RUPTURED GLOBE OF LEFT EYE, SUBSEQUENT ENCOUNTER: Primary | ICD-10-CM

## 2022-11-10 PROCEDURE — 99024 POSTOP FOLLOW-UP VISIT: CPT | Mod: ,,, | Performed by: OPHTHALMOLOGY

## 2022-11-10 PROCEDURE — 99024 PR POST-OP FOLLOW-UP VISIT: ICD-10-PCS | Mod: ,,, | Performed by: OPHTHALMOLOGY

## 2022-11-10 PROCEDURE — 99999 PR PBB SHADOW E&M-EST. PATIENT-LVL I: CPT | Mod: PBBFAC,,, | Performed by: OPHTHALMOLOGY

## 2022-11-10 PROCEDURE — 99211 OFF/OP EST MAY X REQ PHY/QHP: CPT | Mod: PBBFAC | Performed by: OPHTHALMOLOGY

## 2022-11-10 PROCEDURE — 99999 PR PBB SHADOW E&M-EST. PATIENT-LVL I: ICD-10-PCS | Mod: PBBFAC,,, | Performed by: OPHTHALMOLOGY

## 2022-11-13 NOTE — PROGRESS NOTES
HPI    ED referral     S/p open globe repair OS 8/18/22 - gilson  s/p PPV/2ndary IOL (Date 9/26/22 by Mila/Joy) for NCVH, Globe trauma,   Aphakia, Vitreous Prolapse  VH OS      Pt presents to clinic for continued ocular care. He states that his vision   is improving slightly. No pain.  Finished PF drops.  Thelmasin Rolando   translated for Enzo.     Pred QID OS  Vigamox QID OS  Combigan BID OS     Last edited by Luzmaria Christianson MD on 11/13/2022  1:35 PM.            Assessment /Plan     For exam results, see Encounter Report.    Ruptured globe of left eye, subsequent encounter         S/p open globe repair OS 8/18/22 - gilson    s/p PPV/2ndary IOL (Date 9/26/22 by Mila/Joy) for NCVH, Globe trauma, Aphakia, Vitreous Prolapse    VH OS    Doing well, - suture removed with pre and post abx without complication    F/up 1 mo - plan for additional suture removal in procedure room.

## 2023-01-03 ENCOUNTER — OFFICE VISIT (OUTPATIENT)
Dept: OPHTHALMOLOGY | Facility: CLINIC | Age: 43
End: 2023-01-03

## 2023-01-03 DIAGNOSIS — H43.12 VITREOUS HEMORRHAGE, LEFT: ICD-10-CM

## 2023-01-03 DIAGNOSIS — S05.32XD RUPTURED GLOBE OF LEFT EYE, SUBSEQUENT ENCOUNTER: Primary | ICD-10-CM

## 2023-01-03 DIAGNOSIS — H27.02 APHAKIA, LEFT: ICD-10-CM

## 2023-01-03 PROCEDURE — 99212 OFFICE O/P EST SF 10 MIN: CPT | Mod: PBBFAC | Performed by: OPHTHALMOLOGY

## 2023-01-03 PROCEDURE — 99024 PR POST-OP FOLLOW-UP VISIT: ICD-10-PCS | Mod: ,,, | Performed by: OPHTHALMOLOGY

## 2023-01-03 PROCEDURE — 99999 PR PBB SHADOW E&M-EST. PATIENT-LVL II: ICD-10-PCS | Mod: PBBFAC,,, | Performed by: OPHTHALMOLOGY

## 2023-01-03 PROCEDURE — 99999 PR PBB SHADOW E&M-EST. PATIENT-LVL II: CPT | Mod: PBBFAC,,, | Performed by: OPHTHALMOLOGY

## 2023-01-03 PROCEDURE — 99024 POSTOP FOLLOW-UP VISIT: CPT | Mod: ,,, | Performed by: OPHTHALMOLOGY

## 2023-01-03 NOTE — PROGRESS NOTES
HPI    POM #2 DFE OS    CC: Pt states he has no pain. Pt states his Va has improved a lot since   last visit.     GTTS: NONE  Last edited by Caprice Napier on 1/3/2023  8:44 AM.         A/P    ICD-10-CM ICD-9-CM   1. Ruptured globe of left eye, subsequent encounter  S05.32XD V58.89     871.0   2. Vitreous hemorrhage, left  H43.12 379.23   3. Aphakia, left  H27.02 379.31       1. Ruptured globe of left eye, subsequent encounter  2. Vitreous hemorrhage, left  3. Aphakia, left  S/p globe injury and repair 8/18/22 (glass bottle broke and piece hit left eye - no IOFB noted)  Initial VA was HM    Found on post-op visits to be aphakic and have VH on B scan    Pre-Op VA CF (can get to 20/200 with +10.5D lens), retina flat, old VH, no tears or detachment seen but limited view in focal areas temporally but no obvious tear or detachment, aphakic    Postop: s/p PPV/2ndary IOL (Date 9/26/22 by Mila/Joy) for NCVH, Globe trauma, Aphakia, Vitreous Prolapse    1/3/2023 VA 20/100 (was 20/300), lens well centered no tilt, corneal edema better, saw Dr. Christianson in Nov, retina flat     Observe from retina perspective, continue f/u with Dr. Christianson for suture removal        Instructions reviewed   - RD precautions  - Return for increasing pain/decreasing vision  - No getting the eye wet, no rubbing the eye, no heavy lifting for 1 month after surgery       RTC Mila 6 mo DFE OU  RTC Sammy  as scheduled     I saw and examined the patient and reviewed in detail the findings documented. The final examination findings, image interpretations, and plan as documented in the record represent my personal judgment and conclusions.    Ritesh Zaragoza MD  Vitreoretinal Surgery   [facility]

## 2023-01-05 ENCOUNTER — PROCEDURE VISIT (OUTPATIENT)
Dept: OPHTHALMOLOGY | Facility: CLINIC | Age: 43
End: 2023-01-05

## 2023-01-05 DIAGNOSIS — S05.32XD RUPTURED GLOBE OF LEFT EYE, SUBSEQUENT ENCOUNTER: Primary | ICD-10-CM

## 2023-01-05 PROCEDURE — 65222 PR REMV F.B.,EYE,CORNEA,SLIT LAMP: ICD-10-PCS | Mod: S$PBB,LT,, | Performed by: OPHTHALMOLOGY

## 2023-01-05 PROCEDURE — 99214 PR OFFICE/OUTPT VISIT, EST, LEVL IV, 30-39 MIN: ICD-10-PCS | Mod: 25,S$PBB,, | Performed by: OPHTHALMOLOGY

## 2023-01-05 PROCEDURE — 65222 REMOVE FOREIGN BODY FROM EYE: CPT | Mod: PBBFAC | Performed by: OPHTHALMOLOGY

## 2023-01-05 PROCEDURE — 65222 REMOVE FOREIGN BODY FROM EYE: CPT | Mod: S$PBB,LT,, | Performed by: OPHTHALMOLOGY

## 2023-01-05 PROCEDURE — 99214 OFFICE O/P EST MOD 30 MIN: CPT | Mod: 25,S$PBB,, | Performed by: OPHTHALMOLOGY

## 2023-01-05 NOTE — PROGRESS NOTES
HPI    ED referral       S/p open globe repair OS 8/18/22 - gilson   s/p PPV/2ndary IOL (Date 9/26/22 by Mila/Joy) for NCVH, Globe trauma,   Aphakia, Vitreous Prolapse   VH OS     Pt here for suture removal OS. Pt without complaints today OS. Pt denies   eye pain OS.   Last edited by Rocio Pate MA on 1/5/2023  4:48 PM.            Assessment /Plan     For exam results, see Encounter Report.    Ruptured globe of left eye, subsequent encounter        S/p open globe repair OS 8/18/22 - gilson    s/p PPV/2ndary IOL (Date 9/26/22 by Mila/Joy) for NCVH, Globe trauma, Aphakia, Vitreous Prolapse    VH OS    Doing well, -  multiple sutures removed with pre and post abx without complication in the procedure room    Moxi tid x 3 days    Ready for specialty CL fit next.

## 2023-03-23 ENCOUNTER — OFFICE VISIT (OUTPATIENT)
Dept: OPTOMETRY | Facility: CLINIC | Age: 43
End: 2023-03-23

## 2023-03-23 DIAGNOSIS — S05.32XD RUPTURED GLOBE OF LEFT EYE, SUBSEQUENT ENCOUNTER: Primary | ICD-10-CM

## 2023-03-23 PROCEDURE — 99999 PR PBB SHADOW E&M-EST. PATIENT-LVL II: CPT | Mod: PBBFAC,,, | Performed by: OPTOMETRIST

## 2023-03-23 PROCEDURE — 99999 PR PBB SHADOW E&M-EST. PATIENT-LVL II: ICD-10-PCS | Mod: PBBFAC,,, | Performed by: OPTOMETRIST

## 2023-03-23 PROCEDURE — 92012 INTRM OPH EXAM EST PATIENT: CPT | Mod: S$PBB,,, | Performed by: OPTOMETRIST

## 2023-03-23 PROCEDURE — 92012 PR EYE EXAM, EST PATIENT,INTERMED: ICD-10-PCS | Mod: S$PBB,,, | Performed by: OPTOMETRIST

## 2023-03-23 PROCEDURE — 99212 OFFICE O/P EST SF 10 MIN: CPT | Mod: PBBFAC | Performed by: OPTOMETRIST

## 2023-03-24 NOTE — PROGRESS NOTES
HPI    41 Y/o male is here for 1 month specialty fit   Pt denies pain and discomfort  No f/f  Eye med: no gtt  Last edited by Bertha Milton, OD on 3/23/2023 10:27 AM.            Assessment /Plan     For exam results, see Encounter Report.    Ruptured globe of left eye, subsequent encounter      Fit pt in [employer] Ampleye scleral lenses today. Will contact pt once lenses arrive. REF #[id number] *ROCHELLE FUND*  Cleaning: ClearCare Plus (CL dept has coupons)        Disinfection and Storage: ClearCare Plus (CL dept has coupons)  Rinsing: Purilens (CL dept sells)  Lens [place]: [employer]ens (CL dept sells)    IN-OFFICE TRIAL W/ FIT NOTES:  Contact Lens Current Rx       Current Contact Lens Rx         Brand Base Curve Diameter Sphere Cylinder Over-Sphere Over-Cyl Over-Dist VA    Right            Left Ampleye 4000 8.44 16.5 [place] Sphere +4.75 Sphere 20/20-2   OS: adequate clearance over temp peripheral scarring; C 400  Good edge, 0 @ 7:00  No NaFl stain                  LENS ORDERED:  Contact Lens Final Rx       Final Contact Lens Rx         Brand Base Curve Diameter Sphere Cylinder    Right         Left Ampleye 4000 8.44 16.5 +5.00 Sphere                  This is not a final prescription.  This is specialty order contact lens that requires follow up care and warranty adjustment, dispensing clinic will be responsible for follow up care and warranty adjustments.

## (undated) DEVICE — LENS VITRCTMY OPHTH 30DEG 59DE

## (undated) DEVICE — KIT GREY EYE

## (undated) DEVICE — PACK TOTAL PLUS 25G VITRECTOMY

## (undated) DEVICE — SYR 10CC LUER LOCK

## (undated) DEVICE — KNIFE OPHTH MICRO UNITOME 5MM

## (undated) DEVICE — SOL GONAK

## (undated) DEVICE — TRAY MUSCLE LID EYE

## (undated) DEVICE — SYR 1CC TB SG 27GX1/2

## (undated) DEVICE — GLOVE BIOGEL ECLIPSE SZ 6.5

## (undated) DEVICE — SHEILD & GARTERS FOX METAL EYE

## (undated) DEVICE — SOL WATER STRL IRR 1000ML

## (undated) DEVICE — SHIELD EYE PLASTIC CLEAR ADLT

## (undated) DEVICE — GARTER EYE ADULT

## (undated) DEVICE — NDL 22GA X1 1/2 REG BEVEL

## (undated) DEVICE — COVER MAYO STAND REINFRCD 30

## (undated) DEVICE — CORD FOR BIPOLAR FORCEPS 12

## (undated) DEVICE — SUT 7/0 18IN COATED VICRYL

## (undated) DEVICE — CLOSURE SKIN STERI STRIP 1/2X4

## (undated) DEVICE — FORCEP GRASPING 25GA SMOOTH

## (undated) DEVICE — DRESSING EYE OVAL LF

## (undated) DEVICE — DRAPE THREE-QTR REINF 53X77IN

## (undated) DEVICE — KIT PERFLUOROCARBON LIQUID

## (undated) DEVICE — SYRINGE 30CC LL W/O NDL

## (undated) DEVICE — NDL HYPO A BEVEL 30X1/2

## (undated) DEVICE — SOL BETADINE 5%

## (undated) DEVICE — PROBE ILLUM FLEX CURVE LASER

## (undated) DEVICE — SOL BALANCED SALT 500ML

## (undated) DEVICE — GOWN SURGICAL X-LARGE

## (undated) DEVICE — COVER PROXIMA MAYO STAND

## (undated) DEVICE — NDL RETROBULAR AKTKINSON 23G

## (undated) DEVICE — CONTAINER SPECIMEN STRL 4OZ

## (undated) DEVICE — BACKFLUSH 25GA SOFT-TIP DISP

## (undated) DEVICE — SUT PROLENE 2-0 FS

## (undated) DEVICE — FORCEP GRIESHABER MAXGRIP 25G

## (undated) DEVICE — SYR DISP LL 5CC

## (undated) DEVICE — HOLDER TUBE

## (undated) DEVICE — NEEDLE HYPODERMIC HUB LUER LOC

## (undated) DEVICE — PACK INSTRUMENT COVER DISPO

## (undated) DEVICE — SOL BSS BALANCED SALT